# Patient Record
Sex: MALE | Race: WHITE | Employment: UNEMPLOYED | URBAN - METROPOLITAN AREA
[De-identification: names, ages, dates, MRNs, and addresses within clinical notes are randomized per-mention and may not be internally consistent; named-entity substitution may affect disease eponyms.]

---

## 2017-06-24 ENCOUNTER — HOSPITAL ENCOUNTER (EMERGENCY)
Age: 32
Discharge: HOME | End: 2017-06-24
Attending: EMERGENCY MEDICINE
Payer: MEDICAID

## 2017-06-24 ENCOUNTER — APPOINTMENT (OUTPATIENT)
Dept: GENERAL RADIOLOGY | Age: 32
End: 2017-06-24
Payer: MEDICAID

## 2017-06-24 VITALS
OXYGEN SATURATION: 96 % | RESPIRATION RATE: 25 BRPM | HEART RATE: 67 BPM | DIASTOLIC BLOOD PRESSURE: 60 MMHG | SYSTOLIC BLOOD PRESSURE: 119 MMHG | TEMPERATURE: 98.1 F | BODY MASS INDEX: 35.21 KG/M2 | WEIGHT: 260 LBS | HEIGHT: 72 IN

## 2017-06-24 DIAGNOSIS — R11.2 NAUSEA AND VOMITING, INTRACTABILITY OF VOMITING NOT SPECIFIED, UNSPECIFIED VOMITING TYPE: Primary | ICD-10-CM

## 2017-06-24 DIAGNOSIS — R61 DIAPHORESIS: ICD-10-CM

## 2017-06-24 LAB
ABSOLUTE EOS #: 0.5 K/UL (ref 0–0.4)
ABSOLUTE LYMPH #: 2.9 K/UL (ref 1–4.8)
ABSOLUTE MONO #: 0.7 K/UL (ref 0.1–1.2)
ALBUMIN SERPL-MCNC: 3.9 G/DL (ref 3.5–5.2)
ALBUMIN/GLOBULIN RATIO: 1.2 (ref 1–2.5)
ALP BLD-CCNC: 92 U/L (ref 40–129)
ALT SERPL-CCNC: 18 U/L (ref 5–41)
ANION GAP SERPL CALCULATED.3IONS-SCNC: 14 MMOL/L (ref 9–17)
AST SERPL-CCNC: 22 U/L
BASOPHILS # BLD: 1 %
BASOPHILS ABSOLUTE: 0 K/UL (ref 0–0.2)
BILIRUB SERPL-MCNC: 0.55 MG/DL (ref 0.3–1.2)
BUN BLDV-MCNC: 16 MG/DL (ref 6–20)
BUN/CREAT BLD: ABNORMAL (ref 9–20)
CALCIUM SERPL-MCNC: 8.9 MG/DL (ref 8.6–10.4)
CHLORIDE BLD-SCNC: 100 MMOL/L (ref 98–107)
CO2: 23 MMOL/L (ref 20–31)
CREAT SERPL-MCNC: 0.87 MG/DL (ref 0.7–1.2)
DIFFERENTIAL TYPE: ABNORMAL
EOSINOPHILS RELATIVE PERCENT: 6 %
GFR AFRICAN AMERICAN: >60 ML/MIN
GFR NON-AFRICAN AMERICAN: >60 ML/MIN
GFR SERPL CREATININE-BSD FRML MDRD: ABNORMAL ML/MIN/{1.73_M2}
GFR SERPL CREATININE-BSD FRML MDRD: ABNORMAL ML/MIN/{1.73_M2}
GLUCOSE BLD-MCNC: 117 MG/DL (ref 70–99)
HCT VFR BLD CALC: 42.6 % (ref 41–53)
HEMOGLOBIN: 14.2 G/DL (ref 13.5–17.5)
HIV AG/AB: NONREACTIVE
LYMPHOCYTES # BLD: 34 %
MCH RBC QN AUTO: 27.2 PG (ref 26–34)
MCHC RBC AUTO-ENTMCNC: 33.4 G/DL (ref 31–37)
MCV RBC AUTO: 81.5 FL (ref 80–100)
MONOCYTES # BLD: 8 %
PDW BLD-RTO: 14.8 % (ref 12.5–15.4)
PLATELET # BLD: 236 K/UL (ref 140–450)
PLATELET ESTIMATE: ABNORMAL
PMV BLD AUTO: 8.1 FL (ref 6–12)
POC TROPONIN I: 0 NG/ML (ref 0–0.1)
POC TROPONIN I: 0.01 NG/ML (ref 0–0.1)
POC TROPONIN INTERP: NORMAL
POC TROPONIN INTERP: NORMAL
POTASSIUM SERPL-SCNC: 3.9 MMOL/L (ref 3.7–5.3)
RBC # BLD: 5.23 M/UL (ref 4.5–5.9)
RBC # BLD: ABNORMAL 10*6/UL
SEG NEUTROPHILS: 51 %
SEGMENTED NEUTROPHILS ABSOLUTE COUNT: 4.4 K/UL (ref 1.8–7.7)
SODIUM BLD-SCNC: 137 MMOL/L (ref 135–144)
TOTAL PROTEIN: 7.1 G/DL (ref 6.4–8.3)
TSH SERPL DL<=0.05 MIU/L-ACNC: 2.83 MIU/L (ref 0.3–5)
WBC # BLD: 8.6 K/UL (ref 3.5–11)
WBC # BLD: ABNORMAL 10*3/UL

## 2017-06-24 PROCEDURE — 80053 COMPREHEN METABOLIC PANEL: CPT

## 2017-06-24 PROCEDURE — 96374 THER/PROPH/DIAG INJ IV PUSH: CPT

## 2017-06-24 PROCEDURE — 99284 EMERGENCY DEPT VISIT MOD MDM: CPT

## 2017-06-24 PROCEDURE — 2580000003 HC RX 258: Performed by: EMERGENCY MEDICINE

## 2017-06-24 PROCEDURE — 71010 XR CHEST PORTABLE: CPT

## 2017-06-24 PROCEDURE — 93005 ELECTROCARDIOGRAM TRACING: CPT

## 2017-06-24 PROCEDURE — 84484 ASSAY OF TROPONIN QUANT: CPT

## 2017-06-24 PROCEDURE — 6360000002 HC RX W HCPCS: Performed by: EMERGENCY MEDICINE

## 2017-06-24 PROCEDURE — 84443 ASSAY THYROID STIM HORMONE: CPT

## 2017-06-24 PROCEDURE — 96375 TX/PRO/DX INJ NEW DRUG ADDON: CPT

## 2017-06-24 PROCEDURE — 87389 HIV-1 AG W/HIV-1&-2 AB AG IA: CPT

## 2017-06-24 PROCEDURE — 85025 COMPLETE CBC W/AUTO DIFF WBC: CPT

## 2017-06-24 RX ORDER — 0.9 % SODIUM CHLORIDE 0.9 %
1000 INTRAVENOUS SOLUTION INTRAVENOUS ONCE
Status: COMPLETED | OUTPATIENT
Start: 2017-06-24 | End: 2017-06-24

## 2017-06-24 RX ORDER — PROMETHAZINE HYDROCHLORIDE 25 MG/ML
12.5 INJECTION, SOLUTION INTRAMUSCULAR; INTRAVENOUS ONCE
Status: COMPLETED | OUTPATIENT
Start: 2017-06-24 | End: 2017-06-24

## 2017-06-24 RX ORDER — ONDANSETRON 2 MG/ML
4 INJECTION INTRAMUSCULAR; INTRAVENOUS ONCE
Status: COMPLETED | OUTPATIENT
Start: 2017-06-24 | End: 2017-06-24

## 2017-06-24 RX ADMIN — PROMETHAZINE HYDROCHLORIDE 12.5 MG: 25 INJECTION INTRAMUSCULAR; INTRAVENOUS at 08:09

## 2017-06-24 RX ADMIN — SODIUM CHLORIDE 1000 ML: 9 INJECTION, SOLUTION INTRAVENOUS at 06:10

## 2017-06-24 RX ADMIN — ONDANSETRON 4 MG: 2 INJECTION INTRAMUSCULAR; INTRAVENOUS at 06:09

## 2017-06-24 ASSESSMENT — ENCOUNTER SYMPTOMS
DIARRHEA: 0
ABDOMINAL PAIN: 0
NAUSEA: 1
STRIDOR: 0
PHOTOPHOBIA: 0
COUGH: 0
SHORTNESS OF BREATH: 0
VOMITING: 1

## 2017-06-28 LAB
EKG ATRIAL RATE: 70 BPM
EKG P AXIS: 29 DEGREES
EKG P-R INTERVAL: 206 MS
EKG Q-T INTERVAL: 406 MS
EKG QRS DURATION: 106 MS
EKG QTC CALCULATION (BAZETT): 438 MS
EKG R AXIS: 30 DEGREES
EKG T AXIS: 9 DEGREES
EKG VENTRICULAR RATE: 70 BPM

## 2017-09-09 ENCOUNTER — HOSPITAL ENCOUNTER (EMERGENCY)
Age: 32
Discharge: HOME | End: 2017-09-09
Attending: EMERGENCY MEDICINE
Payer: MEDICAID

## 2017-09-09 VITALS
RESPIRATION RATE: 16 BRPM | DIASTOLIC BLOOD PRESSURE: 70 MMHG | OXYGEN SATURATION: 97 % | WEIGHT: 260 LBS | HEART RATE: 79 BPM | HEIGHT: 72 IN | SYSTOLIC BLOOD PRESSURE: 122 MMHG | TEMPERATURE: 97 F | BODY MASS INDEX: 35.21 KG/M2

## 2017-09-09 DIAGNOSIS — K08.89 PAIN, DENTAL: Primary | ICD-10-CM

## 2017-09-09 PROCEDURE — 6370000000 HC RX 637 (ALT 250 FOR IP): Performed by: EMERGENCY MEDICINE

## 2017-09-09 PROCEDURE — 99282 EMERGENCY DEPT VISIT SF MDM: CPT

## 2017-09-09 PROCEDURE — 64400 NJX AA&/STRD TRIGEMINAL NRV: CPT

## 2017-09-09 RX ORDER — PENICILLIN V POTASSIUM 250 MG/1
500 TABLET ORAL ONCE
Status: COMPLETED | OUTPATIENT
Start: 2017-09-09 | End: 2017-09-09

## 2017-09-09 RX ORDER — ACETAMINOPHEN 325 MG/1
650 TABLET ORAL EVERY 6 HOURS PRN
Qty: 30 TABLET | Refills: 0 | Status: SHIPPED | OUTPATIENT
Start: 2017-09-09 | End: 2018-03-13 | Stop reason: ALTCHOICE

## 2017-09-09 RX ORDER — PENICILLIN V POTASSIUM 500 MG/1
500 TABLET ORAL 4 TIMES DAILY
Qty: 40 TABLET | Refills: 0 | Status: SHIPPED | OUTPATIENT
Start: 2017-09-09 | End: 2017-09-19

## 2017-09-09 RX ORDER — IBUPROFEN 800 MG/1
800 TABLET ORAL EVERY 8 HOURS PRN
Qty: 30 TABLET | Refills: 0 | Status: SHIPPED | OUTPATIENT
Start: 2017-09-09 | End: 2018-03-13 | Stop reason: ALTCHOICE

## 2017-09-09 RX ADMIN — PENICILLIN V POTASIUM 500 MG: 250 TABLET ORAL at 08:48

## 2017-09-09 ASSESSMENT — PAIN SCALES - GENERAL: PAINLEVEL_OUTOF10: 10

## 2017-09-09 ASSESSMENT — ENCOUNTER SYMPTOMS
VOMITING: 0
ABDOMINAL PAIN: 0
DIARRHEA: 0

## 2017-09-13 ENCOUNTER — HOSPITAL ENCOUNTER (EMERGENCY)
Age: 32
Discharge: HOME | End: 2017-09-13
Attending: EMERGENCY MEDICINE
Payer: MEDICAID

## 2017-09-13 VITALS
SYSTOLIC BLOOD PRESSURE: 142 MMHG | DIASTOLIC BLOOD PRESSURE: 78 MMHG | RESPIRATION RATE: 16 BRPM | HEART RATE: 80 BPM | OXYGEN SATURATION: 97 % | TEMPERATURE: 97.3 F

## 2017-09-13 DIAGNOSIS — K08.89 PAIN, DENTAL: Primary | ICD-10-CM

## 2017-09-13 PROCEDURE — G0381 LEV 2 HOSP TYPE B ED VISIT: HCPCS

## 2017-09-13 PROCEDURE — 64400 NJX AA&/STRD TRIGEMINAL NRV: CPT

## 2017-09-13 RX ORDER — ONDANSETRON 4 MG/1
4 TABLET, FILM COATED ORAL EVERY 8 HOURS PRN
Qty: 10 TABLET | Refills: 0 | Status: SHIPPED | OUTPATIENT
Start: 2017-09-13 | End: 2018-03-13 | Stop reason: ALTCHOICE

## 2017-09-13 ASSESSMENT — PAIN DESCRIPTION - LOCATION: LOCATION: TEETH

## 2017-09-13 ASSESSMENT — ENCOUNTER SYMPTOMS
FACIAL SWELLING: 0
EYE DISCHARGE: 0
VOMITING: 0
NAUSEA: 1
SHORTNESS OF BREATH: 0
ABDOMINAL PAIN: 0
EYE REDNESS: 0
SORE THROAT: 0

## 2017-09-13 ASSESSMENT — PAIN DESCRIPTION - PAIN TYPE: TYPE: ACUTE PAIN;CHRONIC PAIN

## 2017-09-13 ASSESSMENT — PAIN SCALES - GENERAL: PAINLEVEL_OUTOF10: 8

## 2017-09-13 ASSESSMENT — PAIN DESCRIPTION - FREQUENCY: FREQUENCY: CONTINUOUS

## 2017-09-13 ASSESSMENT — PAIN DESCRIPTION - DESCRIPTORS: DESCRIPTORS: ACHING

## 2018-03-13 ENCOUNTER — OFFICE VISIT (OUTPATIENT)
Dept: FAMILY MEDICINE CLINIC | Age: 33
End: 2018-03-13
Payer: MEDICAID

## 2018-03-13 VITALS
DIASTOLIC BLOOD PRESSURE: 82 MMHG | SYSTOLIC BLOOD PRESSURE: 138 MMHG | TEMPERATURE: 98.3 F | RESPIRATION RATE: 20 BRPM | WEIGHT: 264 LBS | BODY MASS INDEX: 35.76 KG/M2 | HEART RATE: 82 BPM | HEIGHT: 72 IN | OXYGEN SATURATION: 96 %

## 2018-03-13 DIAGNOSIS — T40.2X5A CONSTIPATION DUE TO OPIOID THERAPY: Primary | ICD-10-CM

## 2018-03-13 DIAGNOSIS — R53.82 CHRONIC FATIGUE: ICD-10-CM

## 2018-03-13 DIAGNOSIS — Z23 NEED FOR TDAP VACCINATION: ICD-10-CM

## 2018-03-13 DIAGNOSIS — Z76.89 ENCOUNTER TO ESTABLISH CARE: ICD-10-CM

## 2018-03-13 DIAGNOSIS — K59.03 CONSTIPATION DUE TO OPIOID THERAPY: Primary | ICD-10-CM

## 2018-03-13 PROBLEM — K59.00 CONSTIPATION: Status: ACTIVE | Noted: 2018-03-13

## 2018-03-13 PROCEDURE — G8427 DOCREV CUR MEDS BY ELIG CLIN: HCPCS | Performed by: NURSE PRACTITIONER

## 2018-03-13 PROCEDURE — G8417 CALC BMI ABV UP PARAM F/U: HCPCS | Performed by: NURSE PRACTITIONER

## 2018-03-13 PROCEDURE — G8484 FLU IMMUNIZE NO ADMIN: HCPCS | Performed by: NURSE PRACTITIONER

## 2018-03-13 PROCEDURE — 90715 TDAP VACCINE 7 YRS/> IM: CPT | Performed by: NURSE PRACTITIONER

## 2018-03-13 PROCEDURE — 99203 OFFICE O/P NEW LOW 30 MIN: CPT | Performed by: NURSE PRACTITIONER

## 2018-03-13 PROCEDURE — 4004F PT TOBACCO SCREEN RCVD TLK: CPT | Performed by: NURSE PRACTITIONER

## 2018-03-13 PROCEDURE — 90471 IMMUNIZATION ADMIN: CPT | Performed by: NURSE PRACTITIONER

## 2018-03-13 RX ORDER — METHADONE HYDROCHLORIDE 40 MG/1
TABLET ORAL
COMMUNITY
End: 2018-09-16

## 2018-03-13 RX ORDER — DOCUSATE SODIUM 100 MG/1
100 CAPSULE, LIQUID FILLED ORAL DAILY
Qty: 30 CAPSULE | Refills: 5 | Status: SHIPPED | OUTPATIENT
Start: 2018-03-13 | End: 2018-09-16

## 2018-03-13 RX ORDER — GREEN TEA/HOODIA GORDONII 315-12.5MG
1 CAPSULE ORAL DAILY
Qty: 30 TABLET | Refills: 5 | Status: SHIPPED | OUTPATIENT
Start: 2018-03-13 | End: 2018-09-16

## 2018-03-13 ASSESSMENT — PATIENT HEALTH QUESTIONNAIRE - PHQ9
1. LITTLE INTEREST OR PLEASURE IN DOING THINGS: 0
SUM OF ALL RESPONSES TO PHQ9 QUESTIONS 1 & 2: 0
2. FEELING DOWN, DEPRESSED OR HOPELESS: 0
SUM OF ALL RESPONSES TO PHQ QUESTIONS 1-9: 0

## 2018-03-13 ASSESSMENT — ENCOUNTER SYMPTOMS
COUGH: 0
DIARRHEA: 0
CONSTIPATION: 1
CHEST TIGHTNESS: 0
SHORTNESS OF BREATH: 0
ABDOMINAL PAIN: 1
NAUSEA: 0

## 2018-03-13 NOTE — PROGRESS NOTES
Visit Information    Have you changed or started any medications since your last visit including any over-the-counter medicines, vitamins, or herbal medicines? no   Have you stopped taking any of your medications? Is so, why? -  no  Are you having any side effects from any of your medications? - no    Have you seen any other physician or provider since your last visit?  no   Have you had any other diagnostic tests since your last visit?  no   Have you been seen in the emergency room and/or had an admission in a hospital since we last saw you?  no   Have you had your routine dental cleaning in the past 6 months?  no     Do you have an active MyChart account? If no, what is the barrier?   Yes    No care team member to display    Medical History Review  Past Medical, Family, and Social History reviewed and does not contribute to the patient presenting condition    Health Maintenance   Topic Date Due    DTaP/Tdap/Td vaccine (1 - Tdap) 06/07/2002    Pneumococcal med risk (1 of 1 - PPSV23) 06/07/2002    Flu vaccine (1) 09/01/2017    HIV screen  Completed

## 2018-03-13 NOTE — PROGRESS NOTES
[address] [place]  [phone]    Amy Amaro is a 27 y.o. male who presents today for his  medical conditions/complaints as noted below. Amy Amaro is c/o of Establish Care (has not seen a physician in years); Fatigue (x1 year); Dizziness; and Abdominal Pain    HPI:     HPI   Pt here to establish. Pt c/o fatigue, dizziness and abd pain for a year. Goes to bed early, 8pm, up at 5am every day. This is his schedule because he has to be downtown for methadone dose by 5:30. He goes daily because he does not go to [facility] so they wont send him home with take homes. He does not regularly nap.  abd pain causes the dizziness. I use the bathroom once a day most of the time. He does have occassions where he goes a few days without a stool. I normally have to have a bm when I get home from [facility]. I don't know if I am constipated. Sometimes the stool is hard. No blood or black tarry stools. No hematuria. On methadone, 85 mg everyday. wishes he could taper down. He has asked his counselor but he states they never make any change. Pt verbalizes that he understands that he needs to be more in control over this. Diet- I dont eat breakfast, lunch is subway or [employer], his wife cooks dinner every night. No fruits or vegetables. Corn and potatoes. He drinks a lot of coffee. He puts a lot of sugar in his coffee. He has gained 60 lbs in three years. Exercise- no   Two kids 10 and 6 both boys. Recently had some dental work, needs more    Nursing note reviewed and discussed with patient. Patient's medications, allergies, past medical, surgical, social and family histories were reviewed and updated as appropriate. No current outpatient prescriptions on file prior to visit. No current facility-administered medications on file prior to visit.       Past Medical History:   Diagnosis Date    Substance abuse detoxing, using Methadone       Past Surgical History:   Procedure Laterality Date    FACIAL COSMETIC SURGERY      from dog bite injury     Family History   Problem Relation Age of Onset    Cancer Other      lung, breast, stomach in grandparents   [facility] Other Mother      suicide    Cancer Father      lung     Social History   Substance Use Topics    Smoking status: Current Every Day Smoker     Packs/day: 1.00     Years: 10.00     Types: Cigarettes    Smokeless tobacco: Never Used    Alcohol use No      Allergies   Allergen Reactions    Codeine Nausea And Vomiting       Subjective:      Review of Systems   Constitutional: Positive for fatigue. Negative for activity change, appetite change, chills and fever. HENT: Negative for congestion, ear pain and hearing loss. Eyes: Negative for visual disturbance. Respiratory: Negative for cough, chest tightness and shortness of breath. Cardiovascular: Negative for chest pain, palpitations and leg swelling. Gastrointestinal: Positive for abdominal pain and constipation. Negative for diarrhea and nausea. Genitourinary: Negative for discharge and frequency. Musculoskeletal: Negative for arthralgias and myalgias. Skin: Negative for rash. Neurological: Positive for dizziness. Negative for light-headedness and headaches. Psychiatric/Behavioral: Negative for dysphoric mood and self-injury. Other pertinent ROS in HPI  Objective:     /82 (Site: Right Arm, Position: Sitting, Cuff Size: Medium Adult)   Pulse 82   Temp 98.3 °F (36.8 °C) (Oral)   Resp 20   Ht 6' 0.01\" (1.829 m)   Wt 264 lb (119.7 kg)   SpO2 96%   BMI 35.80 kg/m²    Physical Exam   Constitutional: He is oriented to person, place, and time. He appears well-developed and well-nourished. Obese   HENT:   Head: Normocephalic and atraumatic.    Right Ear: External ear normal.   Left Ear: External ear normal.   Nose: Nose normal.   Mouth/Throat: Oropharynx is clear and moist. Abnormal

## 2018-05-28 ENCOUNTER — HOSPITAL ENCOUNTER (EMERGENCY)
Age: 33
Discharge: HOME | End: 2018-05-28
Attending: EMERGENCY MEDICINE
Payer: MEDICAID

## 2018-05-28 VITALS
WEIGHT: 268 LBS | HEART RATE: 88 BPM | HEIGHT: 72 IN | TEMPERATURE: 97.8 F | RESPIRATION RATE: 14 BRPM | OXYGEN SATURATION: 99 % | DIASTOLIC BLOOD PRESSURE: 68 MMHG | SYSTOLIC BLOOD PRESSURE: 123 MMHG | BODY MASS INDEX: 36.3 KG/M2

## 2018-05-28 DIAGNOSIS — M54.32 SCIATICA OF LEFT SIDE: Primary | ICD-10-CM

## 2018-05-28 PROCEDURE — 6370000000 HC RX 637 (ALT 250 FOR IP): Performed by: PHYSICIAN ASSISTANT

## 2018-05-28 PROCEDURE — 6360000002 HC RX W HCPCS: Performed by: PHYSICIAN ASSISTANT

## 2018-05-28 PROCEDURE — 99283 EMERGENCY DEPT VISIT LOW MDM: CPT

## 2018-05-28 PROCEDURE — 96372 THER/PROPH/DIAG INJ SC/IM: CPT

## 2018-05-28 RX ORDER — ORPHENADRINE CITRATE 30 MG/ML
60 INJECTION INTRAMUSCULAR; INTRAVENOUS ONCE
Status: COMPLETED | OUTPATIENT
Start: 2018-05-28 | End: 2018-05-28

## 2018-05-28 RX ORDER — IBUPROFEN 800 MG/1
800 TABLET ORAL EVERY 8 HOURS PRN
Qty: 20 TABLET | Refills: 0 | Status: SHIPPED | OUTPATIENT
Start: 2018-05-28 | End: 2018-09-16

## 2018-05-28 RX ORDER — KETOROLAC TROMETHAMINE 30 MG/ML
60 INJECTION, SOLUTION INTRAMUSCULAR; INTRAVENOUS ONCE
Status: COMPLETED | OUTPATIENT
Start: 2018-05-28 | End: 2018-05-28

## 2018-05-28 RX ORDER — PREDNISONE 50 MG/1
50 TABLET ORAL DAILY
Qty: 5 TABLET | Refills: 0 | Status: SHIPPED | OUTPATIENT
Start: 2018-05-28 | End: 2018-06-02

## 2018-05-28 RX ADMIN — ORPHENADRINE CITRATE 60 MG: 30 INJECTION INTRAMUSCULAR; INTRAVENOUS at 20:38

## 2018-05-28 RX ADMIN — PREDNISONE 50 MG: 10 TABLET ORAL at 20:39

## 2018-05-28 RX ADMIN — KETOROLAC TROMETHAMINE 60 MG: 30 INJECTION, SOLUTION INTRAMUSCULAR at 20:39

## 2018-05-28 ASSESSMENT — PAIN SCALES - GENERAL
PAINLEVEL_OUTOF10: 8
PAINLEVEL_OUTOF10: 8

## 2018-05-28 ASSESSMENT — PAIN DESCRIPTION - ORIENTATION: ORIENTATION: LEFT

## 2018-05-28 ASSESSMENT — PAIN DESCRIPTION - LOCATION: LOCATION: BACK;LEG

## 2018-06-13 PROBLEM — K02.9 DENTAL CARIES: Status: ACTIVE | Noted: 2017-09-25

## 2018-09-16 ENCOUNTER — HOSPITAL ENCOUNTER (EMERGENCY)
Age: 33
Discharge: HOME | End: 2018-09-16
Attending: EMERGENCY MEDICINE
Payer: MEDICAID

## 2018-09-16 VITALS
RESPIRATION RATE: 18 BRPM | OXYGEN SATURATION: 97 % | WEIGHT: 263 LBS | HEART RATE: 83 BPM | HEIGHT: 72 IN | DIASTOLIC BLOOD PRESSURE: 86 MMHG | TEMPERATURE: 98.4 F | BODY MASS INDEX: 35.62 KG/M2 | SYSTOLIC BLOOD PRESSURE: 133 MMHG

## 2018-09-16 DIAGNOSIS — J40 BRONCHITIS: Primary | ICD-10-CM

## 2018-09-16 LAB
EKG ATRIAL RATE: 75 BPM
EKG P AXIS: 41 DEGREES
EKG P-R INTERVAL: 172 MS
EKG Q-T INTERVAL: 376 MS
EKG QRS DURATION: 98 MS
EKG QTC CALCULATION (BAZETT): 419 MS
EKG R AXIS: 35 DEGREES
EKG T AXIS: 14 DEGREES
EKG VENTRICULAR RATE: 75 BPM

## 2018-09-16 PROCEDURE — 93005 ELECTROCARDIOGRAM TRACING: CPT

## 2018-09-16 PROCEDURE — 6370000000 HC RX 637 (ALT 250 FOR IP): Performed by: EMERGENCY MEDICINE

## 2018-09-16 PROCEDURE — 99284 EMERGENCY DEPT VISIT MOD MDM: CPT

## 2018-09-16 RX ORDER — METHADONE HYDROCHLORIDE 40 MG/1
12 TABLET ORAL DAILY
COMMUNITY
End: 2022-03-29

## 2018-09-16 RX ORDER — BENZONATATE 100 MG/1
100 CAPSULE ORAL ONCE
Status: COMPLETED | OUTPATIENT
Start: 2018-09-16 | End: 2018-09-16

## 2018-09-16 RX ORDER — PSEUDOEPHEDRINE HYDROCHLORIDE 30 MG/1
30 TABLET ORAL EVERY 6 HOURS PRN
Qty: 20 TABLET | Refills: 1 | Status: SHIPPED | OUTPATIENT
Start: 2018-09-16 | End: 2018-09-21

## 2018-09-16 RX ORDER — BENZONATATE 100 MG/1
100 CAPSULE ORAL 3 TIMES DAILY PRN
Qty: 30 CAPSULE | Refills: 0 | Status: SHIPPED | OUTPATIENT
Start: 2018-09-16 | End: 2018-09-23

## 2018-09-16 RX ADMIN — BENZONATATE 100 MG: 100 CAPSULE ORAL at 06:43

## 2018-09-16 ASSESSMENT — ENCOUNTER SYMPTOMS
CHEST TIGHTNESS: 1
COUGH: 1
ALLERGIC/IMMUNOLOGIC NEGATIVE: 1
EYES NEGATIVE: 1
GASTROINTESTINAL NEGATIVE: 1

## 2018-09-16 ASSESSMENT — PAIN SCALES - GENERAL: PAINLEVEL_OUTOF10: 2

## 2018-09-16 ASSESSMENT — PAIN DESCRIPTION - DESCRIPTORS: DESCRIPTORS: ACHING

## 2018-09-16 NOTE — ED PROVIDER NOTES
display       LABS:  Labs Reviewed - No data to display    EMERGENCY DEPARTMENT COURSE:     -------------------------  BP: 133/86, Temp: 98.4 °F (36.9 °C), Pulse: 83, Resp: 18  Physical Exam   Constitutional: He is oriented to person, place, and time. He appears well-developed and well-nourished. HENT:   Head: Normocephalic and atraumatic. Right Ear: External ear normal.   Left Ear: External ear normal.   Eyes: Right eye exhibits no discharge. Left eye exhibits no discharge. No scleral icterus. Neck: Normal range of motion. No tracheal deviation present. Pulmonary/Chest: Effort normal. No stridor. No respiratory distress. Musculoskeletal: Normal range of motion. Neurological: He is alert and oriented to person, place, and time. Coordination normal.   Skin: Skin is warm and dry. He is not diaphoretic. Psychiatric: He has a normal mood and affect. His behavior is normal.         Comments  The pt has no signs of pneumonia, no wheezing or signs of asthma, the pt smokes and has no signs of desaturation. Patient was instructed on smoking and tobacco use cessation, intermediate, greater than 3 minutes. Reasons to return to the ED, potential complications, expected course of the patient's condition, and recommended treatments were discussed with the patient. The patient was also instructed to follow up with their primary care physician and/or any appropriate specialists. The patient expressed understanding and agreement, and will be discharged in good condition. EKG Interpretation   Interpreted by Isabella Matos DO    Rhythm: normal sinus   Rate: normal  Axis: normal  Ectopy: none  Conduction: normal  ST Segments: normal  T Waves: normal  Q Waves: none    Clinical Impression: no acute changes normal EKG        Lange DO, RDMS.   Attending Emergency Physician          Isabella Matos DO  09/16/18 7389

## 2018-09-16 NOTE — ED PROVIDER NOTES
[facility]  ED  Emergency Department Encounter  Emergency Medicine Resident     Pt Name: Annette Kaur  MRN: [id number]  [place] 1985  Date of evaluation: 9/16/18  PCP:  MASOUD Keller [id number]       Chief Complaint   Patient presents with    Cough    Other     chest congestion, chest tightness    Nasal Congestion     since thursday       HISTORY OF PRESENT ILLNESS  (Location/Symptom, Timing/Onset, Context/Setting, Quality, Duration, Modifying Factors, Severity.)      Annette Kaur is a 26 y.o. male who presents with  complains of a cough, chest tightness isn't going on since Thursday. Patient states that initially cough is productive of greenish sputum but over the past couple days it is cleared for whitish sputum. Patient also complains of a sore throat that has since resolved. Patient states that initially he had a runny nose but that has also resolved. He reports 1 day of chills. Patient states that he has had pneumonia in the past and was worried about it today. Denies any history of high blood pressure, denies any shortness of diabetes, previous history of heart disease. Patient is currently on methadone, denies any IV drug use. PAST MEDICAL / SURGICAL / SOCIAL / FAMILY HISTORY      has a past medical history of Substance abuse.     has a past surgical history that includes Facial cosmetic surgery. Social History     Social History    Marital status: Single     Spouse name: N/A    Number of children: N/A    Years of education: N/A     Occupational History    Not on file. Social History Main Topics    Smoking status: Current Every Day Smoker     Packs/day: 1.00     Years: 10.00     Types: Cigarettes    Smokeless tobacco: Never Used    Alcohol use No    Drug use: No      Comment: recovering heroin abuse.     Sexual activity: Yes     Partners: Female     Other Topics Concern    Not on file     Social History Narrative    No He exhibits no tenderness. Abdominal: Soft. Bowel sounds are normal. He exhibits no distension. There is no tenderness. There is no guarding. Musculoskeletal: Normal range of motion. He exhibits no edema or deformity. Lymphadenopathy:     He has no cervical adenopathy. Neurological: He is alert and oriented to person, place, and time. No cranial nerve deficit. He exhibits normal muscle tone. Skin: Skin is warm and dry. No rash noted. He is not diaphoretic. Nursing note and vitals reviewed. DIFFERENTIAL  DIAGNOSIS     PLAN (LABS / IMAGING / EKG):  Orders Placed This Encounter   Procedures    EKG 12 Lead       MEDICATIONS ORDERED:  Orders Placed This Encounter   Medications    benzonatate (TESSALON) capsule 100 mg    benzonatate (TESSALON PERLES) 100 MG capsule     Sig: Take 1 capsule by mouth 3 times daily as needed for Cough     Dispense:  30 capsule     Refill:  0       DDX: URI, bronchitis, pneumonia, ACS    DIAGNOSTIC RESULTS / EMERGENCY DEPARTMENT COURSE / MDM     LABS:  No results found for this visit on 09/16/18. IMPRESSION: URI, postviral bronchitis. Vital signs are unremarkable. Physical exam, patient has normal heart size, normal lung sounds, patient looks very well. We'll get a screening EKG, treat with Tessalon Perles and continue to monitor. EKG  EKG Interpretation    Rhythm: normal sinus   Rate: normal  Axis: normal  Conduction: normal  ST Segments: normal  T Waves: normal  Q Waves: normal    Clinical Impression: Normal EKG. Juanita Vega MD      All EKG's are interpreted by the Emergency Department Physician who either signs or Co-signs this chart in the absence of a cardiologist.      PROCEDURES:  None    CONSULTS:  None    CRITICAL CARE:  None    FINAL IMPRESSION      1.  Bronchitis          DISPOSITION / PLAN     DISPOSITION Decision To Discharge 09/16/2018 07:47:34 AM      PATIENT REFERRED TO:  MASOUD Belcher - CNP  [address] [place] [address]  [phone]    Schedule an appointment as soon as possible for a visit   For Follow up appointment    [facility] ED  [address] [place] [address]  [phone]  Go to   As needed, If symptoms worsen      DISCHARGE MEDICATIONS:  New Prescriptions    BENZONATATE (TESSALON PERLES) 100 MG CAPSULE    Take 1 capsule by mouth 3 times daily as needed for Cough       Simin Norton MD  Emergency Medicine Resident    (Please note that portions of this note were completed with a voice recognition program.  Efforts were made to edit the dictations but occasionally words are mis-transcribed.)       Simin Norton MD  Resident  09/16/18 0044

## 2018-09-16 NOTE — ED NOTES
Report recd from [facility], pt resting without distress. No concerns voiced.       Hiwot Rubalcava RN  09/16/18 [id number]

## 2019-01-08 ENCOUNTER — TELEPHONE (OUTPATIENT)
Dept: GASTROENTEROLOGY | Age: 34
End: 2019-01-08

## 2019-01-10 ENCOUNTER — OFFICE VISIT (OUTPATIENT)
Dept: GASTROENTEROLOGY | Age: 34
End: 2019-01-10
Payer: MEDICAID

## 2019-01-10 VITALS
DIASTOLIC BLOOD PRESSURE: 85 MMHG | HEIGHT: 72 IN | BODY MASS INDEX: 35.58 KG/M2 | WEIGHT: 262.7 LBS | HEART RATE: 87 BPM | SYSTOLIC BLOOD PRESSURE: 131 MMHG

## 2019-01-10 DIAGNOSIS — K59.00 CONSTIPATION, UNSPECIFIED CONSTIPATION TYPE: Primary | ICD-10-CM

## 2019-01-10 DIAGNOSIS — R11.0 NAUSEA: ICD-10-CM

## 2019-01-10 DIAGNOSIS — R14.0 ABDOMINAL BLOATING: ICD-10-CM

## 2019-01-10 DIAGNOSIS — K21.9 GASTROESOPHAGEAL REFLUX DISEASE WITHOUT ESOPHAGITIS: ICD-10-CM

## 2019-01-10 DIAGNOSIS — K62.89 RECTAL PAIN: ICD-10-CM

## 2019-01-10 PROCEDURE — 99244 OFF/OP CNSLTJ NEW/EST MOD 40: CPT | Performed by: INTERNAL MEDICINE

## 2019-01-10 PROCEDURE — G8484 FLU IMMUNIZE NO ADMIN: HCPCS | Performed by: INTERNAL MEDICINE

## 2019-01-10 PROCEDURE — G8427 DOCREV CUR MEDS BY ELIG CLIN: HCPCS | Performed by: INTERNAL MEDICINE

## 2019-01-10 PROCEDURE — G8417 CALC BMI ABV UP PARAM F/U: HCPCS | Performed by: INTERNAL MEDICINE

## 2019-01-10 ASSESSMENT — ENCOUNTER SYMPTOMS
VOMITING: 0
ABDOMINAL DISTENTION: 0
CHEST TIGHTNESS: 0
COLOR CHANGE: 0
SINUS PRESSURE: 0
EYE REDNESS: 0
ABDOMINAL PAIN: 1
NAUSEA: 1
WHEEZING: 0
EYE PAIN: 0
TROUBLE SWALLOWING: 0
DIARRHEA: 0
SINUS PAIN: 0
BLOOD IN STOOL: 0
CONSTIPATION: 1
ANAL BLEEDING: 1
SHORTNESS OF BREATH: 0
BACK PAIN: 0
RECTAL PAIN: 1

## 2019-01-11 RX ORDER — POLYETHYLENE GLYCOL 3350 17 G/17G
POWDER, FOR SOLUTION ORAL
Qty: 255 G | Refills: 0 | Status: SHIPPED | OUTPATIENT
Start: 2019-01-11 | End: 2020-06-21

## 2019-01-14 ENCOUNTER — TELEPHONE (OUTPATIENT)
Dept: GASTROENTEROLOGY | Age: 34
End: 2019-01-14

## 2019-06-18 ENCOUNTER — HOSPITAL ENCOUNTER (EMERGENCY)
Age: 34
Discharge: HOME | End: 2019-06-18
Attending: EMERGENCY MEDICINE
Payer: MEDICAID

## 2019-06-18 ENCOUNTER — APPOINTMENT (OUTPATIENT)
Dept: GENERAL RADIOLOGY | Age: 34
End: 2019-06-18
Payer: MEDICAID

## 2019-06-18 VITALS
DIASTOLIC BLOOD PRESSURE: 84 MMHG | RESPIRATION RATE: 14 BRPM | TEMPERATURE: 99.1 F | HEART RATE: 92 BPM | OXYGEN SATURATION: 98 % | SYSTOLIC BLOOD PRESSURE: 138 MMHG

## 2019-06-18 DIAGNOSIS — S90.221A SUBUNGUAL HEMATOMA OF TOE OF RIGHT FOOT, INITIAL ENCOUNTER: Primary | ICD-10-CM

## 2019-06-18 PROCEDURE — 73630 X-RAY EXAM OF FOOT: CPT

## 2019-06-18 PROCEDURE — 6370000000 HC RX 637 (ALT 250 FOR IP): Performed by: EMERGENCY MEDICINE

## 2019-06-18 PROCEDURE — 11740 EVACUATION SUBUNGUAL HMTMA: CPT

## 2019-06-18 PROCEDURE — 99283 EMERGENCY DEPT VISIT LOW MDM: CPT

## 2019-06-18 RX ORDER — ACETAMINOPHEN 325 MG/1
325 TABLET ORAL EVERY 6 HOURS PRN
Qty: 120 TABLET | Refills: 0 | Status: SHIPPED | OUTPATIENT
Start: 2019-06-18 | End: 2020-06-21

## 2019-06-18 RX ORDER — ACETAMINOPHEN 500 MG
1000 TABLET ORAL ONCE
Status: COMPLETED | OUTPATIENT
Start: 2019-06-18 | End: 2019-06-18

## 2019-06-18 RX ADMIN — ACETAMINOPHEN 1000 MG: 500 TABLET ORAL at 10:05

## 2019-06-18 ASSESSMENT — PAIN DESCRIPTION - ORIENTATION: ORIENTATION: RIGHT

## 2019-06-18 ASSESSMENT — ENCOUNTER SYMPTOMS
ALLERGIC/IMMUNOLOGIC NEGATIVE: 1
GASTROINTESTINAL NEGATIVE: 1
RESPIRATORY NEGATIVE: 1
COLOR CHANGE: 1
EYES NEGATIVE: 1

## 2019-06-18 ASSESSMENT — PAIN DESCRIPTION - LOCATION: LOCATION: TOE (COMMENT WHICH ONE)

## 2019-06-18 ASSESSMENT — PAIN DESCRIPTION - DESCRIPTORS: DESCRIPTORS: CONSTANT

## 2019-06-18 ASSESSMENT — PAIN DESCRIPTION - FREQUENCY: FREQUENCY: CONTINUOUS

## 2019-06-18 ASSESSMENT — PAIN DESCRIPTION - PAIN TYPE: TYPE: ACUTE PAIN

## 2019-06-18 ASSESSMENT — PAIN SCALES - GENERAL
PAINLEVEL_OUTOF10: 8
PAINLEVEL_OUTOF10: 8

## 2019-06-18 NOTE — ED PROVIDER NOTES
LABS:  Labs Reviewed - No data to display      EMERGENCY DEPARTMENT COURSE:     -------------------------       BP: 138/84, Temp: 99.1 °F (37.3 °C), Pulse: 92, Resp: 14      Comments            Moore MD, F.A.C.E.P.   Attending Emergency Physician         Marina Morel MD  06/18/19 [id number]

## 2019-06-18 NOTE — ED TRIAGE NOTES
Pt presents to ED with steady gait c/o pain to right great toe after dropping can of corn on his foot this morning. Pt states he took 1600mg ibuprofen at 0630 this morning. Pt denies any other complaints at this time. NAD noted respirations even and non labored. Call light within reach, will continue to monitor.

## 2019-06-18 NOTE — ED PROVIDER NOTES
[facility] ED  Emergency Department Encounter  EmergencyMedicine Resident     Pt Name:Dwight Scherer  MRN: [id number]  [place] 1985  Date of evaluation: 6/18/19  PCP:  No primary care provider on file. CHIEF COMPLAINT       Chief Complaint   Patient presents with    Toe Pain     c/o right great to pain after can of corn fell on his toe, pt states he took 1600mg ibuprofen around 0630       HISTORY OF PRESENT ILLNESS  (Location/Symptom, Timing/Onset, Context/Setting, Quality, Duration, Modifying Factors, Severity.)      Blanka Calzada is a 37 y.o. male who presents with pain to the right toe. Patient reports that he mistakenly dropped a big can of corn on his right big toe. PAST MEDICAL / SURGICAL / SOCIAL / FAMILY HISTORY      has a past medical history of Substance abuse ([facility] [address] has a past surgical history that includes Facial cosmetic surgery. Social History     Socioeconomic History    Marital status: Single     Spouse name: Not on file    Number of children: Not on file    Years of education: Not on file    Highest education level: Not on file   Occupational History    Not on file   Social Needs    Financial resource strain: Not on file    Food insecurity:     Worry: Not on file     Inability: Not on file    Transportation needs:     Medical: Not on file     Non-medical: Not on file   Tobacco Use    Smoking status: Current Every Day Smoker     Packs/day: 1.00     Years: 10.00     Pack years: 10.00     Types: Cigarettes    Smokeless tobacco: Never Used   Substance and Sexual Activity    Alcohol use: No    Drug use: No     Comment: recovering heroin abuse.     Sexual activity: Yes     Partners: Female   Lifestyle    Physical activity:     Days per week: Not on file     Minutes per session: Not on file    Stress: Not on file   Relationships    Social connections:     Talks on phone: Not on file     Gets together: Not on file     Attends Voodoo service: Not on file     Active member of club or organization: Not on file     Attends meetings of clubs or organizations: Not on file     Relationship status: Not on file    Intimate partner violence:     Fear of current or ex partner: Not on file     Emotionally abused: Not on file     Physically abused: Not on file     Forced sexual activity: Not on file   Other Topics Concern    Not on file   Social History Narrative    Not on file       Family History   Problem Relation Age of Onset    Cancer Other         lung, breast, stomach in grandparents   Lisa Pierce Other Mother         suicide   Lisa Pierce Cancer Father         lung       Allergies:  Codeine    Home Medications:  Prior to Admission medications    Medication Sig Start Date End Date Taking? Authorizing Provider   acetaminophen (TYLENOL) 325 MG tablet Take 1 tablet by mouth every 6 hours as needed for Pain 6/18/19  Yes Timothy Bro MD   polyethylene glycol (GLYCOLAX) powder Use as directed by following your patient instructions given by office. 1/11/19   Cesilia Wolfe MD   methadone (METHADOSE) 40 MG disintegrating tablet Take 56 mg by mouth daily. Margoth Ward Historical Provider, MD       REVIEW OF SYSTEMS    (2-9 systems for level 4, 10 or more for level 5)      Review of Systems   Constitutional: Negative. HENT: Negative. Eyes: Negative. Respiratory: Negative. Cardiovascular: Negative. Gastrointestinal: Negative. Endocrine: Negative. Genitourinary: Negative. Musculoskeletal: Positive for arthralgias. Skin: Positive for color change. Allergic/Immunologic: Negative. Neurological: Negative. Hematological: Negative. Psychiatric/Behavioral: Negative. PHYSICAL EXAM   (up to 7 for level 4, 8 or more for level 5)      INITIAL VITALS:   /84   Pulse 92   Temp 99.1 °F (37.3 °C) (Oral)   Resp 14   SpO2 98%     Physical Exam   Constitutional: He is oriented to person, place, and time.    HENT:   Head: Normocephalic and COMPARISON: None. HISTORY: ORDERING SYSTEM PROVIDED HISTORY: toe pain TECHNOLOGIST PROVIDED HISTORY: toe pain Ordering Physician Provided Reason for Exam: right big toe pain Acuity: Acute Type of Exam: Initial 41-year-old male with right big toe pain FINDINGS: Osseous alignment is normal.  Joint spaces are well maintained. No marginal erosions are identified. No acute fracture or gross dislocation is seen. The medial and middle cuneiforms demonstrate proper alignment with the base of the 1st and 2nd metatarsals respectively. No tibiotalar joint effusion is seen. Boehler's angle is maintained. Mild plantar calcaneal spur. Mild enthesopathy at the distal Achilles tendon insertion onto the posterior calcaneus. No acute fracture or gross dislocation. Trephination of the right big toe subungual hematoma performed at bedside with small amount of blood expressed from the holes. Clean dressing applied to the right big toe, patient provided with crutches and fracture boot, Tylenol, instructed to follow-up with PCP. PROCEDURES:  None    CONSULTS:  None    CRITICAL CARE:  None    FINAL IMPRESSION      1.  Subungual hematoma of toe of right foot, initial encounter          DISPOSITION / PLAN     DISPOSITION Decision To Discharge 06/18/2019 10:55:28 AM      PATIENT REFERRED TO:  [facility] ED  [address] [place] [address]  [phone]  Go to   As needed, If symptoms worsen      DISCHARGE MEDICATIONS:  New Prescriptions    ACETAMINOPHEN (TYLENOL) 325 MG TABLET    Take 1 tablet by mouth every 6 hours as needed for Pain       Kathleen Peng MD  Emergency Medicine Resident    (Please note that portions of thisnote were completed with a voice recognition program.  Efforts were made to edit the dictations but occasionally words are mis-transcribed.)       Kathleen Peng MD  Resident  06/18/19 1100

## 2020-06-21 ENCOUNTER — HOSPITAL ENCOUNTER (EMERGENCY)
Age: 35
Discharge: HOME | End: 2020-06-21
Attending: EMERGENCY MEDICINE
Payer: MEDICAID

## 2020-06-21 VITALS
BODY MASS INDEX: 35.89 KG/M2 | WEIGHT: 265 LBS | SYSTOLIC BLOOD PRESSURE: 140 MMHG | DIASTOLIC BLOOD PRESSURE: 83 MMHG | OXYGEN SATURATION: 97 % | TEMPERATURE: 97.9 F | HEART RATE: 76 BPM | RESPIRATION RATE: 18 BRPM | HEIGHT: 72 IN

## 2020-06-21 PROCEDURE — 99282 EMERGENCY DEPT VISIT SF MDM: CPT

## 2020-06-21 PROCEDURE — 6370000000 HC RX 637 (ALT 250 FOR IP): Performed by: STUDENT IN AN ORGANIZED HEALTH CARE EDUCATION/TRAINING PROGRAM

## 2020-06-21 RX ORDER — CYCLOBENZAPRINE HCL 5 MG
5 TABLET ORAL 2 TIMES DAILY PRN
Qty: 10 TABLET | Refills: 0 | Status: SHIPPED | OUTPATIENT
Start: 2020-06-21 | End: 2020-07-01

## 2020-06-21 RX ORDER — CYCLOBENZAPRINE HCL 10 MG
5 TABLET ORAL ONCE
Status: COMPLETED | OUTPATIENT
Start: 2020-06-21 | End: 2020-06-21

## 2020-06-21 RX ADMIN — CYCLOBENZAPRINE 5 MG: 10 TABLET, FILM COATED ORAL at 08:55

## 2020-06-21 ASSESSMENT — ENCOUNTER SYMPTOMS
COUGH: 0
SHORTNESS OF BREATH: 0
VOMITING: 0
ABDOMINAL PAIN: 0
NAUSEA: 0

## 2020-06-21 ASSESSMENT — PAIN DESCRIPTION - DESCRIPTORS: DESCRIPTORS: SHARP;SHOOTING

## 2020-06-21 ASSESSMENT — PAIN SCALES - GENERAL: PAINLEVEL_OUTOF10: 8

## 2020-06-21 ASSESSMENT — PAIN DESCRIPTION - PAIN TYPE: TYPE: ACUTE PAIN

## 2020-06-21 ASSESSMENT — PAIN DESCRIPTION - LOCATION: LOCATION: NECK

## 2020-06-21 ASSESSMENT — PAIN DESCRIPTION - ORIENTATION: ORIENTATION: RIGHT

## 2020-06-26 ENCOUNTER — OFFICE VISIT (OUTPATIENT)
Dept: INTERNAL MEDICINE CLINIC | Age: 35
End: 2020-06-26
Payer: MEDICAID

## 2020-06-26 VITALS
HEIGHT: 72 IN | HEART RATE: 88 BPM | RESPIRATION RATE: 16 BRPM | DIASTOLIC BLOOD PRESSURE: 78 MMHG | BODY MASS INDEX: 36.03 KG/M2 | TEMPERATURE: 98.2 F | SYSTOLIC BLOOD PRESSURE: 118 MMHG | WEIGHT: 266 LBS

## 2020-06-26 PROCEDURE — 99203 OFFICE O/P NEW LOW 30 MIN: CPT | Performed by: INTERNAL MEDICINE

## 2020-06-26 PROCEDURE — G8417 CALC BMI ABV UP PARAM F/U: HCPCS | Performed by: INTERNAL MEDICINE

## 2020-06-26 PROCEDURE — 4004F PT TOBACCO SCREEN RCVD TLK: CPT | Performed by: INTERNAL MEDICINE

## 2020-06-26 PROCEDURE — G8427 DOCREV CUR MEDS BY ELIG CLIN: HCPCS | Performed by: INTERNAL MEDICINE

## 2020-06-26 RX ORDER — METHOCARBAMOL 750 MG/1
750 TABLET, FILM COATED ORAL 3 TIMES DAILY
Qty: 30 TABLET | Refills: 0 | Status: SHIPPED | OUTPATIENT
Start: 2020-06-26 | End: 2020-07-06

## 2020-06-26 RX ORDER — PREDNISONE 20 MG/1
40 TABLET ORAL DAILY
Qty: 14 TABLET | Refills: 0 | Status: SHIPPED | OUTPATIENT
Start: 2020-06-26 | End: 2020-07-03

## 2020-06-26 ASSESSMENT — PATIENT HEALTH QUESTIONNAIRE - PHQ9
SUM OF ALL RESPONSES TO PHQ9 QUESTIONS 1 & 2: 0
SUM OF ALL RESPONSES TO PHQ QUESTIONS 1-9: 0
SUM OF ALL RESPONSES TO PHQ QUESTIONS 1-9: 0
1. LITTLE INTEREST OR PLEASURE IN DOING THINGS: 0
2. FEELING DOWN, DEPRESSED OR HOPELESS: 0

## 2020-06-26 NOTE — PROGRESS NOTES
and smokes 1 pack daily and dont drink alcohol. 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 Pt dont exercise and feels he eats healthy. Previous PCP dr. Jyotsna Clinton and recent labs. Specialities pt is following with:   GI - chronic stomach pain many years, colonscopy due  [facility] center- ex-heroid addict - 7yr ago, on methadone. No h/o iv drug use. Chronic conditions being monitored:      Concerns today:  Neck pain  Seen in ER on 6/21, diagnosed with trapezius muscle spasm.    Duration-10days  Severity- severe, improving  Context- no other joint problems,   Associated signs and symptoms-  Pins and needles in right arm on turning head to right  Right arm weakness   Modifying factors- flexeril partial help    Alcohol/smoking-    REVIEW OF SYSTEMS (except Subjective (HPI))    CONSTITUTIONAL: No weight loss, malaise or fevers  HEENT: Negative for frequent or significant headaches, No changes in hearing or vision, no nose bleeds or other nasal problems,  NECK: Negative for lumps, goiter, pain and significant neck swelling  RESPIRATORY: Negative for cough, hemoptysis, wheezing, or shortness of breath  CARDIOVASCULAR: Negative for chest pain, leg swelling,or palpitations  GI: No nausea, vomiting, or diarrhea or Constipation  : No history of dysuria, frequency or incontinence, or hematuria  MUSCULOSKELETAL: Negative for joint pain or swelling, positive for neck pain  SKIN: Negative for lesions, rash, and itching  PSYCH: Negative for sleep disturbance, mood disorder and recent psychosocial stressors  NEURO: No history of headaches, syncope, paralysis, seizures or tremors    ACTIVE PROBLEM LIST  Patient Active Problem List   Diagnosis    Constipation    Dental caries    Rectal pain    Gastroesophageal reflux disease without esophagitis    Nausea    Abdominal bloating         PAST MEDICAL HISTORY:    Past Medical History:   Diagnosis Date    Substance abuse ([facility] [address]     detoxing, using Methadone PAST SURGICAL HISTORY:    Past Surgical History:   Procedure Laterality Date    FACIAL COSMETIC SURGERY      from dog bite injury       FAMILY HISTORY:    Family History   Problem Relation Age of Onset    Cancer Other         lung, breast, stomach in grandparents   [de-identified] Other Mother         suicide   [de-identified] Cancer Father         lung        SOCIAL HISTORY:    Social History     Socioeconomic History    Marital status: Single     Spouse name: Not on file    Number of children: Not on file    Years of education: Not on file    Highest education level: Not on file   Occupational History    Not on file   Social Needs    Financial resource strain: Not on file    Food insecurity     Worry: Not on file     Inability: Not on file    Transportation needs     Medical: Not on file     Non-medical: Not on file   Tobacco Use    Smoking status: Current Every Day Smoker     Packs/day: 1.00     Years: 10.00     Pack years: 10.00     Types: Cigarettes    Smokeless tobacco: Never Used   Substance and Sexual Activity    Alcohol use: No    Drug use: Yes     Types: Marijuana     Comment: recovering heroin abuse.     Sexual activity: Yes     Partners: Female   Lifestyle    Physical activity     Days per week: Not on file     Minutes per session: Not on file    Stress: Not on file   Relationships    Social connections     Talks on phone: Not on file     Gets together: Not on file     Attends [employer] service: Not on file     Active member of club or organization: Not on file     Attends meetings of clubs or organizations: Not on file     Relationship status: Not on file    Intimate partner violence     Fear of current or ex partner: Not on file     Emotionally abused: Not on file     Physically abused: Not on file     Forced sexual activity: Not on file   Other Topics Concern    Not on file   Social History Narrative    Not on file       CURRENT MEDICATIONS:  Current Outpatient Medications   Medication Sig Dispense Refill    cyclobenzaprine (FLEXERIL) 5 MG tablet Take 1 tablet by mouth 2 times daily as needed for Muscle spasms 10 tablet 0    methadone (METHADOSE) 40 MG disintegrating tablet Take 12 mg by mouth daily. No current facility-administered medications for this visit. OBJECTIVE:  Vitals:    06/26/20 1550   BP: 118/78   Pulse: 88   Resp: 16   Temp: 98.2 °F (36.8 °C)       Focal exam:  Midline point tenderness over neck, C6-7 region- causes tingling in arm  No bony tenderness around the shoulder joint    General exam (except above):  Constitutional - well appearing, alert, in no acute distress  Eyes: Anicteric sclera. Pupils are equally round and reactive to light. Extraocular movements are intact. Skin: Skin color, texture, turgor normal  Respiratory - Lungs clear to auscultation. No wheezing, rhonchi, rales  Cardiovascular - RRR. S1S2 present. No leg swelling. Gastrointestinal - Abdomen soft, non-tender. Bowel sounds normal. No masses, organomegaly  Musculoskeletal - No joint swelling, deformity, or tenderness  Neuro - Pt Alert, awake and oriented x 3. No gross focal neurological deficits      ASSESSMENT AND PLAN (MEDICAL DECISION MAKING):   There are no diagnoses linked to this encounter. Cici Mccain was seen today for establish care. Diagnoses and all orders for this visit:    Neuropathy, arm, right  -     MRI CERVICAL SPINE W WO CONTRAST; Future  -     predniSONE (DELTASONE) 20 MG tablet; Take 2 tablets by mouth daily for 7 days  -     methocarbamol (ROBAXIN-750) 750 MG tablet; Take 1 tablet by mouth 3 times daily for 10 days    Trapezius muscle spasm  -     MRI CERVICAL SPINE W WO CONTRAST; Future  -     predniSONE (DELTASONE) 20 MG tablet; Take 2 tablets by mouth daily for 7 days  -     methocarbamol (ROBAXIN-750) 750 MG tablet;  Take 1 tablet by mouth 3 times daily for 10 days         Follow up in: 1week      Katerine Hoover MD

## 2020-06-27 NOTE — ED PROVIDER NOTES
Emergency Medicine Attending Note    I have seen and examined the patient in conjunction with the Resident/MLP. Please see my key portion documented:      I agree with the assessment and plan as discussed with Dr. Gorden Meckel. Electronically signed:  JOAQUÍN Aldrich MD  06/27/20 2018

## 2020-07-02 ENCOUNTER — TELEPHONE (OUTPATIENT)
Dept: INTERNAL MEDICINE CLINIC | Age: 35
End: 2020-07-02

## 2020-07-02 NOTE — TELEPHONE ENCOUNTER
Per [facility] Radiology dept MRI CERVICAL SPINE W WO CONTRAST ordered by Dr. Isis Yao should only be ordered w/out contrast.    It's very rare that cervical spine is w and w/out. If looking for neuropathy there is no reason. Test reordered.

## 2020-07-06 ENCOUNTER — HOSPITAL ENCOUNTER (OUTPATIENT)
Dept: MRI IMAGING | Facility: CLINIC | Age: 35
Discharge: HOME | End: 2020-07-08
Payer: MEDICAID

## 2020-07-06 PROCEDURE — 72141 MRI NECK SPINE W/O DYE: CPT

## 2020-07-07 ENCOUNTER — TELEPHONE (OUTPATIENT)
Dept: INTERNAL MEDICINE CLINIC | Age: 35
End: 2020-07-07

## 2020-07-07 RX ORDER — GABAPENTIN 100 MG/1
100 CAPSULE ORAL 2 TIMES DAILY
Qty: 60 CAPSULE | Refills: 1 | Status: SHIPPED | OUTPATIENT
Start: 2020-07-07 | End: 2022-03-29

## 2020-07-07 NOTE — TELEPHONE ENCOUNTER
Please inform patient his neck MRI does show some disc protrusion and nerve irritation at the level of the lower neck as discussed during office visit. I am referring him to Dr. Nickie Holter for further assessment of his spine. Please help him set up an appt. In the meantime, if his pain is not controlled, he can try neurontin- I am sending to his pharmacy. Patient to be made aware that this medication can cause drowsiness, he should not drive or operate heavy machinery if the medication is making him drowsy.        Fatuma Hinojosa

## 2020-07-09 ENCOUNTER — OFFICE VISIT (OUTPATIENT)
Dept: INTERNAL MEDICINE CLINIC | Age: 35
End: 2020-07-09
Payer: MEDICAID

## 2020-07-09 VITALS
HEIGHT: 72 IN | BODY MASS INDEX: 36.03 KG/M2 | DIASTOLIC BLOOD PRESSURE: 78 MMHG | OXYGEN SATURATION: 96 % | HEART RATE: 96 BPM | TEMPERATURE: 98 F | SYSTOLIC BLOOD PRESSURE: 124 MMHG | WEIGHT: 266 LBS

## 2020-07-09 PROCEDURE — 99212 OFFICE O/P EST SF 10 MIN: CPT | Performed by: INTERNAL MEDICINE

## 2020-07-09 PROCEDURE — G8417 CALC BMI ABV UP PARAM F/U: HCPCS | Performed by: INTERNAL MEDICINE

## 2020-07-09 PROCEDURE — G8427 DOCREV CUR MEDS BY ELIG CLIN: HCPCS | Performed by: INTERNAL MEDICINE

## 2020-07-09 PROCEDURE — 4004F PT TOBACCO SCREEN RCVD TLK: CPT | Performed by: INTERNAL MEDICINE

## 2020-07-09 ASSESSMENT — PATIENT HEALTH QUESTIONNAIRE - PHQ9
SUM OF ALL RESPONSES TO PHQ QUESTIONS 1-9: 0
SUM OF ALL RESPONSES TO PHQ QUESTIONS 1-9: 0
2. FEELING DOWN, DEPRESSED OR HOPELESS: 0
1. LITTLE INTEREST OR PLEASURE IN DOING THINGS: 0
SUM OF ALL RESPONSES TO PHQ9 QUESTIONS 1 & 2: 0

## 2020-07-09 NOTE — PROGRESS NOTES
Chief Complaint   Patient presents with    Results     Review MRI: Pt was not able to have labs done        Visit Information    Have you changed or started any medications since your last visit including any over-the-counter medicines, vitamins, or herbal medicines? no   Are you having any side effects from any of your medications? -  no  Have you stopped taking any of your medications? Is so, why? -  no    Have you seen any other physician or provider since your last visit? No  Have you had any other diagnostic tests since your last visit? Yes - Records Obtained  Have you been seen in the emergency room and/or had an admission to a hospital since we last saw you? No  Have you had your routine dental cleaning in the past 6 months? no    Have you activated your [employer] account? If not, what are your barriers? Yes     Patient Care Team:  Candance Chol, MD as PCP - General (Internal Medicine)  Candance Chol, MD as PCP - [facility] Provider    Medical History Review  Past Medical, Family, and Social History reviewed and does not contribute to the patient presenting condition    Health Maintenance   Topic Date Due    Varicella vaccine (1 of 2 - 2-dose childhood series) 06/07/1984    Flu vaccine (1) 09/01/2020    DTaP/Tdap/Td vaccine (3 - Td) 03/13/2028    Pneumococcal 0-64 years Vaccine  Completed    HIV screen  Completed    Hepatitis A vaccine  Aged Out    Hepatitis B vaccine  Aged Out    Hib vaccine  Aged Out    Meningococcal (ACWY) vaccine  Aged Out     SUBJECTIVE:  Natasha Bennett is a 38 y.o. male patient who  comes for complaints of   Chief Complaint   Patient presents with    Results     Review MRI: Pt was not able to have labs done        Was seen for right side trapezius muscle spasm and arm neuropathy 2 wk aog  Treated with po prednisone  All symptoms resolved.        MRI shows disc protrusion\discussed      REVIEW OF SYSTEMS (except Subjective (HPI))  GENERAL: No fevers / chills  RESPIRATORY: Negative for cough, wheezing or shortness of breath  CARDIOVASCULAR: Negative for chest pain or palpitations. GI: no nausea, vomiting, or diarrhea  NEURO: No history of headaches    Past Medical History:   Diagnosis Date    Substance abuse ([facility] [address]     detoxing, using Methadone        SOCIAL HISTORY:  Social History     Socioeconomic History    Marital status: Single     Spouse name: Not on file    Number of children: Not on file    Years of education: Not on file    Highest education level: Not on file   Occupational History    Not on file   Social Needs    Financial resource strain: Not on file    Food insecurity     Worry: Not on file     Inability: Not on file    Transportation needs     Medical: Not on file     Non-medical: Not on file   Tobacco Use    Smoking status: Current Every Day Smoker     Packs/day: 1.00     Years: 10.00     Pack years: 10.00     Types: Cigarettes    Smokeless tobacco: Never Used   Substance and Sexual Activity    Alcohol use: No    Drug use: Yes     Types: Marijuana     Comment: recovering heroin abuse.     Sexual activity: Yes     Partners: Female   Lifestyle    Physical activity     Days per week: Not on file     Minutes per session: Not on file    Stress: Not on file   Relationships    Social connections     Talks on phone: Not on file     Gets together: Not on file     Attends [employer] service: Not on file     Active member of club or organization: Not on file     Attends meetings of clubs or organizations: Not on file     Relationship status: Not on file    Intimate partner violence     Fear of current or ex partner: Not on file     Emotionally abused: Not on file     Physically abused: Not on file     Forced sexual activity: Not on file   Other Topics Concern    Not on file   Social History Narrative    Not on file           CURRENT MEDICATIONS:  Current Outpatient Medications   Medication Sig Dispense Refill    gabapentin (NEURONTIN) 100 MG capsule Take 1

## 2020-07-21 ENCOUNTER — HOSPITAL ENCOUNTER (EMERGENCY)
Age: 35
Discharge: HOME | End: 2020-07-21
Attending: EMERGENCY MEDICINE
Payer: MEDICAID

## 2020-07-21 ENCOUNTER — APPOINTMENT (OUTPATIENT)
Dept: GENERAL RADIOLOGY | Age: 35
End: 2020-07-21
Payer: MEDICAID

## 2020-07-21 VITALS
OXYGEN SATURATION: 99 % | SYSTOLIC BLOOD PRESSURE: 145 MMHG | BODY MASS INDEX: 35.89 KG/M2 | HEIGHT: 72 IN | RESPIRATION RATE: 18 BRPM | DIASTOLIC BLOOD PRESSURE: 75 MMHG | WEIGHT: 265 LBS | TEMPERATURE: 98.4 F | HEART RATE: 88 BPM

## 2020-07-21 PROCEDURE — 71045 X-RAY EXAM CHEST 1 VIEW: CPT

## 2020-07-21 PROCEDURE — 93005 ELECTROCARDIOGRAM TRACING: CPT | Performed by: EMERGENCY MEDICINE

## 2020-07-21 PROCEDURE — 99285 EMERGENCY DEPT VISIT HI MDM: CPT

## 2020-07-21 RX ORDER — METHADONE HYDROCHLORIDE 5 MG/5ML
11 SOLUTION ORAL DAILY
COMMUNITY
End: 2022-03-29

## 2020-07-21 ASSESSMENT — ENCOUNTER SYMPTOMS
SHORTNESS OF BREATH: 1
EYES NEGATIVE: 1
GASTROINTESTINAL NEGATIVE: 1
ABDOMINAL PAIN: 0
COUGH: 1

## 2020-07-21 ASSESSMENT — PAIN SCALES - GENERAL: PAINLEVEL_OUTOF10: 5

## 2020-07-21 NOTE — ED PROVIDER NOTES
K62.89    Gastroesophageal reflux disease without esophagitis K21.9    Nausea R11.0    Abdominal bloating R14.0     SURGICAL HISTORY       Past Surgical History:   Procedure Laterality Date    FACIAL COSMETIC SURGERY      from dog bite injury     CURRENT MEDICATIONS       Discharge Medication List as of 2020 10:06 AM      CONTINUE these medications which have NOT CHANGED    Details   methadone 5 MG/5ML solution Take 11 mg by mouth daily. Historical Med      gabapentin (NEURONTIN) 100 MG capsule Take 1 capsule by mouth 2 times daily for 30 days. , Disp-60 capsule, R-1Normal      methadone (METHADOSE) 40 MG disintegrating tablet Take 12 mg by mouth daily. Historical Med           ALLERGIES     is allergic to codeine. FAMILY HISTORY     He indicated that his mother is . He indicated that his father is . He indicated that his maternal grandmother is . He indicated that his maternal grandfather is . He indicated that his paternal grandmother is . He indicated that his paternal grandfather is . He indicated that the status of his other is unknown. SOCIAL HISTORY       Social History     Tobacco Use    Smoking status: Current Every Day Smoker     Packs/day: 1.00     Years: 25.00     Pack years: 25.00     Types: Cigarettes    Smokeless tobacco: Never Used   Substance Use Topics    Alcohol use: No    Drug use: Yes     Types: Marijuana     Comment: pt uses cannabis; currently [facility]; previous snorting heroin      PHYSICAL EXAM     INITIAL VITALS: BP (!) 145/75   Pulse 88   Temp 98.4 °F (36.9 °C) (Oral)   Resp 18   Ht 6' (1.829 m)   Wt 265 lb (120.2 kg)   SpO2 99%   BMI 35.94 kg/m²    Physical Exam  Vitals signs and nursing note reviewed. Constitutional:       Appearance: Normal appearance. HENT:      Head: Normocephalic and atraumatic. Nose: No congestion.       Mouth/Throat:      Mouth: Mucous membranes are moist. this time there is a critical shortage of SARS-Coronavirus-2 PCR testing, very limited criteria for testing, and we are unable to test the patient at this time since criteria is not met. Direct patient contact is avoided at this time due to the critically low supplies of PPE worldwide and an effort to fernando appropriate use of PPE. I performed a medical screening exam that is compliant with the Declaration of [facility] [place], secondary to the Pandemic Infectious Disease of SARS-Coronavirus-2. We are not testing for influenza or other viral etiologies at this time due to the significant evidence of virus coinfections during this pandemic. The patient is referred to appropriate outpatient follow up through their PCP or [facility]. I discussed with the patient home isolation measures, anticipatory guidance, discharge instructions, and to return immediately for worsening of symptoms. The patient is agreeable with this plan. CRITICAL CARE:       PROCEDURES:    Procedures    DIAGNOSTIC RESULTS   EKG:All EKG's are interpreted by the Emergency Department Physician who either signs or Co-signs this chart in the absence of a cardiologist.    Ekg, rate of 81, nsr, no st seg changes, normal pr and qrs, normal ekg. RADIOLOGY:All plain film, CT, MRI, and formal ultrasound images (except ED bedside ultrasound) are read by the radiologist, see reports below, unless otherwisenoted in MDM or here. XR CHEST PORTABLE   Final Result   Mildly prominent interstitial change seen at the lung bases, however this is   similar to 2017. Minimal superimposed atelectasis at the left lung base. No   focal consolidation or pleural effusion is identified. No acute osseous   abnormality. LABS: All lab results were reviewed by myself, and all abnormals are listed below.   Labs Reviewed - No data to display    EMERGENCY DEPARTMENTCOURSE:         Vitals:    Vitals:    07/21/20 0856   BP: (!) 145/75   Pulse: 88   Resp: 18   Temp: 98.4 °F (36.9 °C)   TempSrc: Oral   SpO2: 99%   Weight: 265 lb (120.2 kg)   Height: 6' (1.829 m)       The patient was given the following medications while in the emergency department:  No orders of the defined types were placed in this encounter. CONSULTS:  None    FINAL IMPRESSION      1. Chest pain, unspecified type    2.  Viral syndrome          DISPOSITION/PLAN   DISPOSITION Decision To Discharge 07/21/2020 10:05:44 AM      PATIENT REFERRED TO:  Kingston Vitale MD  [address]  [phone]    Call in 1 day      DISCHARGE MEDICATIONS:  Discharge Medication List as of 7/21/2020 10:06 AM        Yaima Walton MD  Attending Emergency Physician                   Cinda Carlos MD  07/21/20 8705

## 2020-07-21 NOTE — ACP (ADVANCE CARE PLANNING)
Advance Care Planning     Advance Care Planning Activator (Inpatient)  Conversation Note      Date of ACP Conversation: 7/21/2020    Conversation Conducted with: Patient with Decision Making Capacity    ACP Activator: [address] Butler makes decisions on behalf of the incapacitated patient: Decision Maker is asked to consider and make decisions based on patient values, known preferences, or best interests. Health Care Decision Maker:     Current Designated Health Care Decision Maker:   (If there is a valid [place] named in the [address] Makers\" box in the ACP activity, but it is not visible above, be sure to open that field and then select the health care decision maker relationship (ie \"primary\") in the blank space to the right of the name.) Validate  this information as still accurate & up-to-date; edit [place] field as needed.)    Note: Assess and validate information in current ACP documents, as indicated. If no Decision Maker listed above or available through scanned documents, then:    If no Authorized Decision Maker has previously been identified, then patient chooses [place]:  \"Who would you like to name as your primary health care decision-maker? \"               Name: Anais Moscoso        Relationship: Girlfriend          Phone number: [phone]  Alessandra Sean this person be reached easily? \" Yes    Note: If the relationship of these Decision-Makers to the patient does NOT follow your state's Next of Kin hierarchy, recommend that patient complete ACP document that meets state-specific requirements to allow them to act on the patient's behalf when appropriate. Care Preferences    Ventilation: \"If you were in your present state of health and suddenly became very ill and were unable to breathe on your own, what would your preference be about the use of a ventilator (breathing machine) if it were available to you? \" Would the patient desire the use of ventilator (breathing machine)?: yes    \"If your health worsens and it becomes clear that your chance of recovery is unlikely, what would your preference be about the use of a ventilator (breathing machine) if it were available to you? \"     Would the patient desire the use of ventilator (breathing machine)?: Yes      Resuscitation  \"CPR works best to restart the heart when there is a sudden event, like a heart attack, in someone who is otherwise healthy. Unfortunately, CPR does not typically restart the heart for people who have serious health conditions or who are very sick. \"    \"In the event your heart stopped as a result of an underlying serious health condition, would you want attempts to be made to restart your heart (answer \"yes\" for attempt to resuscitate) or would you prefer a natural death (answer \"no\" for do not attempt to resuscitate)? \" yes      NOTE: If the patient has a valid advance directive AND now provides care preference(s) that are inconsistent with that prior directive, advise the patient to consider either: creating a new advance directive that complies with state-specific requirements; or, if that is not possible, orally revoking that prior directive in accordance with state-specific requirements, which must be documented in the EHR. [x] Yes   [] No   Educated Patient / Chanelle Schmidts regarding differences between Advance Directives and portable DNR orders.     Length of ACP Conversation in minutes:  15    Conversation Outcomes:  [x] ACP discussion completed  [] Existing advance directive reviewed with patient; no changes to patient's previously recorded wishes  [] New Advance Directive completed  [] Portable Do Not Rescitate prepared for Provider review and signature  [] POLST/POST/MOLST/MOST prepared for Provider review and signature      Follow-up plan:    [] Schedule follow-up conversation to continue planning  [] Referred individual to Provider for additional questions/concerns   [] Advised patient/agent/surrogate to review completed ACP document and update if needed with changes in condition, patient preferences or care setting    [] This note routed to one or more involved healthcare providers

## 2020-07-21 NOTE — ED NOTES
Mode of arrival (squad #, walk in, police, etc) : walk in        Chief complaint(s): SOB, cough, fatigue        Arrival Note (brief scenario, treatment PTA, etc). : patient states for the past 4 days he has had a dry, hacking cough, SOB with exertion, and increased fatigue. Patient states he hasn't had much energy recently. Patient states he is a daily smoker, but has cut back since his symptoms increased 4 days ago. Patient states he was informed that a coworker tested positive for COVID and is currently hospitalized for it. Patient states he has been in close contact with this person within the past week to 2 weeks. Patient denies any fevers, chills, nausea, vomiting, or abdominal pain. Patient is alert and oriented x4.         C= \"Have you ever felt that you should Cut down on your drinking? \"  No  A= \"Have people Annoyed you by criticizing your drinking? \"  No  G= \"Have you ever felt bad or Guilty about your drinking? \"  No  E= \"Have you ever had a drink as an Eye-opener first thing in the morning to steady your nerves or to help a hangover? \"  No      Deferred []      Reason for deferring: N/A    *If yes to two or more: probable alcohol abuse. William Duncan RN  07/21/20 2088

## 2020-07-22 ENCOUNTER — CARE COORDINATION (OUTPATIENT)
Dept: CARE COORDINATION | Age: 35
End: 2020-07-22

## 2020-07-22 LAB
EKG ATRIAL RATE: 81 BPM
EKG P AXIS: 60 DEGREES
EKG P-R INTERVAL: 180 MS
EKG Q-T INTERVAL: 362 MS
EKG QRS DURATION: 100 MS
EKG QTC CALCULATION (BAZETT): 420 MS
EKG R AXIS: 38 DEGREES
EKG T AXIS: 22 DEGREES
EKG VENTRICULAR RATE: 81 BPM

## 2020-07-22 NOTE — CARE COORDINATION
Patient contacted regarding COVID-19 exposure. Discussed COVID-19 related testing which was pending at this time. Test results were pending. Patient informed of results, if available? Pending    Care Transition Nurse/ Ambulatory Care Manager contacted the patient by telephone to perform post discharge assessment. Verified name and  with patient as identifiers. Provided introduction to self, and explanation of the CTN/ACM role, and reason for call due to risk factors for infection and/or exposure to COVID-19. Symptoms reviewed with patient who verbalized the following symptoms: fatigue, cough, shortness of breath and chest pain. Due to no new or worsening symptoms encounter was not routed to provider for escalation. Discussed follow-up appointments. If no appointment was previously scheduled, appointment scheduling offered: Yes  [facility] follow up appointment(s):   Future Appointments   Date Time Provider Cordell Gómez   2020  8:50 AM Mer Wu MD [facility]     Non-[facility] follow up appointment(s): N/A     Advance Care Planning:   Does patient have an Advance Directive:  not on file; education provided. Patient has following risk factors of: no known risk factors. CTN/ACM reviewed discharge instructions, medical action plan and red flags such as increased shortness of breath, increasing fever and signs of decompensation with patient who verbalized understanding. Discussed exposure protocols and quarantine with CDC Guidelines What to do if you are sick with coronavirus disease .  Patient was given an opportunity for questions and concerns. The patient agrees to contact the [facility] exposure line [phone], local [facility] department [place] [facility]: ([phone]) and PCP office for questions related to their healthcare. CTN/ACM provided contact information for future needs.     Reviewed and educated patient on any new and changed medications related to discharge diagnosis Patient/family/caregiver given information for [employer] and agrees to enroll yes  Patient's preferred e-mail: [email or web address]   Patient's preferred phone number: [phone]  Based on Loop alert triggers, patient will be contacted by nurse care manager for worsening symptoms. Pt will be further monitored by COVID Loop Team based on severity of symptoms and risk factors.

## 2020-07-28 ENCOUNTER — OFFICE VISIT (OUTPATIENT)
Dept: ORTHOPEDIC SURGERY | Age: 35
End: 2020-07-28
Payer: MEDICAID

## 2020-07-28 PROCEDURE — G8417 CALC BMI ABV UP PARAM F/U: HCPCS | Performed by: ORTHOPAEDIC SURGERY

## 2020-07-28 PROCEDURE — 99203 OFFICE O/P NEW LOW 30 MIN: CPT | Performed by: ORTHOPAEDIC SURGERY

## 2020-07-28 PROCEDURE — G8428 CUR MEDS NOT DOCUMENT: HCPCS | Performed by: ORTHOPAEDIC SURGERY

## 2020-07-28 PROCEDURE — 4004F PT TOBACCO SCREEN RCVD TLK: CPT | Performed by: ORTHOPAEDIC SURGERY

## 2020-07-28 NOTE — PROGRESS NOTES
Patient ID: Cristhian Murdock is a 38 y.o. male    Chief Compliant:  Chief Complaint   Patient presents with    Established New Doctor    Pain     neck pain down right arm         History of Present Illness: This is a 38 y.o. male who presents to the clinic today for evaluation of neck pain that radiates down his right arm since one month ago. He reports today that his neck and shoulder pain has resolved      Review of Systems   Constitutional: Negative for fever, chills, sweats, or weight loss. Eyes: Negative for changes in vision, pain. HENT: Negative for congestion, bleeding, or pain. Respiratory/Cardio: Negative for chest pain, SOB. Gastrointestinal:  Negative for abdominal pain, Nausea/vomiting/diarrhea. Genitourinary: Negative for any urinary symptoms. Neurological: Negative for paresthesia, asthenia. Integumentary: Negative for rash, wounds, itching, ecchymosis. Musculoskeletal: Positive for Established New Doctor and Pain (neck pain down right arm )       Past History:    Current Outpatient Medications:     methadone 5 MG/5ML solution, Take 11 mg by mouth daily. , Disp: , Rfl:     gabapentin (NEURONTIN) 100 MG capsule, Take 1 capsule by mouth 2 times daily for 30 days. , Disp: 60 capsule, Rfl: 1    methadone (METHADOSE) 40 MG disintegrating tablet, Take 12 mg by mouth daily.  , Disp: , Rfl:   Allergies   Allergen Reactions    Codeine Nausea And Vomiting     Social History     Socioeconomic History    Marital status: Single     Spouse name: Not on file    Number of children: Not on file    Years of education: Not on file    Highest education level: Not on file   Occupational History    Not on file   Social Needs    Financial resource strain: Not on file    Food insecurity     Worry: Not on file     Inability: Not on file    Transportation needs     Medical: Not on file     Non-medical: Not on file   Tobacco Use    Smoking status: Current Every Day Smoker     Packs/day: 1.00 Years: 25.00     Pack years: 25.00     Types: Cigarettes    Smokeless tobacco: Never Used   Substance and Sexual Activity    Alcohol use: No    Drug use: Yes     Types: Marijuana     Comment: pt uses cannabis; currently taking [facility]adone from [facility]; previous snorting heroin     Sexual activity: Yes     Partners: Female   Lifestyle    Physical activity     Days per week: Not on file     Minutes per session: Not on file    Stress: Not on file   Relationships    Social connections     Talks on phone: Not on file     Gets together: Not on file     Attends [employer] service: Not on file     Active member of club or organization: Not on file     Attends meetings of clubs or organizations: Not on file     Relationship status: Not on file    Intimate partner violence     Fear of current or ex partner: Not on file     Emotionally abused: Not on file     Physically abused: Not on file     Forced sexual activity: Not on file   Other Topics Concern    Not on file   Social History Narrative    Not on file     Past Medical History:   Diagnosis Date    Cervical disc herniation     C7 herniation    Substance abuse ([facility] [address]     pt uses cannabis; currently taking Methadone from [facility]; previous snorting heroin      Past Surgical History:   Procedure Laterality Date    FACIAL COSMETIC SURGERY      from dog bite injury     Family History   Problem Relation Age of Onset    Cancer Other         lung, breast, stomach in grandparents   [facility] Other Mother         suicide    Cancer Father         lung        Physical Exam:  Constitutional: Patient is oriented to person, place, and time. Patient appears well-developed and well nourished. HENT: Negative otherwise noted  Head: Normocephalic and Atraumatic  Nose: Normal  Eyes: Conjunctivae and EOM are normal  Neck: Normal range of motion Neck supple. Respiratory/Cardio: Effort normal. No respiratory distress. Musculoskeletal: Normal gait.    Neurological: Patient is alert and oriented to person, place, and time. Normal strenght. No sensory deficit. Skin: Skin is warm and dry  Psychiatric: Behavior is normal. Thought content normal.  Nursing note and vitals reviewed. Labs and Imaging:     MRI cervical spine is reviewed mild disc bulge at C6-7 age-appropriate no significant nerve impingement        Assessment and Plan:  1. Neck pain         Pain is resolved    Follow up as needed    Scribe Attestation:   By signing my name below, I, Bruce Hand, attest that this documentation has been prepared under the direction and in the presence of Ruth Ann Vargas MD.     Electronically Signed: Bruce Hand. 7/28/20. 8:57 AM EDT. Provider Attestation:  Conrado Hollis, personally performed the services described in this documentation. All medical record entries made by the scribe were at my direction and in my presence. I have reviewed the chart and discharge instructions and agree that the records reflect my personal performance and is accurate and complete. [place] Luc Lawson MD 07/28/20        Please note that this chart was generated using voice recognition Dragon dictation software. Although every effort was made to ensure the accuracy of this automated transcription, some errors in transcription may have occurred.

## 2021-01-26 ENCOUNTER — OFFICE VISIT (OUTPATIENT)
Dept: INTERNAL MEDICINE CLINIC | Age: 36
End: 2021-01-26
Payer: MEDICAID

## 2021-01-26 VITALS
HEART RATE: 76 BPM | DIASTOLIC BLOOD PRESSURE: 84 MMHG | BODY MASS INDEX: 37.79 KG/M2 | HEIGHT: 72 IN | SYSTOLIC BLOOD PRESSURE: 126 MMHG | RESPIRATION RATE: 16 BRPM | TEMPERATURE: 98.7 F | WEIGHT: 279 LBS

## 2021-01-26 DIAGNOSIS — B36.9 FUNGAL INFECTION OF SKIN: Primary | ICD-10-CM

## 2021-01-26 DIAGNOSIS — E66.9 OBESITY, CLASS II, BMI 35-39.9: ICD-10-CM

## 2021-01-26 DIAGNOSIS — L29.0 RECTAL ITCHING: ICD-10-CM

## 2021-01-26 DIAGNOSIS — Z00.00 HEALTH CARE MAINTENANCE: ICD-10-CM

## 2021-01-26 PROCEDURE — G8427 DOCREV CUR MEDS BY ELIG CLIN: HCPCS | Performed by: INTERNAL MEDICINE

## 2021-01-26 PROCEDURE — G8484 FLU IMMUNIZE NO ADMIN: HCPCS | Performed by: INTERNAL MEDICINE

## 2021-01-26 PROCEDURE — 4004F PT TOBACCO SCREEN RCVD TLK: CPT | Performed by: INTERNAL MEDICINE

## 2021-01-26 PROCEDURE — 99213 OFFICE O/P EST LOW 20 MIN: CPT | Performed by: INTERNAL MEDICINE

## 2021-01-26 PROCEDURE — G8417 CALC BMI ABV UP PARAM F/U: HCPCS | Performed by: INTERNAL MEDICINE

## 2021-01-26 RX ORDER — NYSTATIN 100000 [USP'U]/G
POWDER TOPICAL
Qty: 60 G | Refills: 1 | Status: SHIPPED | OUTPATIENT
Start: 2021-01-26 | End: 2022-03-29

## 2021-01-26 ASSESSMENT — PATIENT HEALTH QUESTIONNAIRE - PHQ9
1. LITTLE INTEREST OR PLEASURE IN DOING THINGS: 0
SUM OF ALL RESPONSES TO PHQ QUESTIONS 1-9: 0
SUM OF ALL RESPONSES TO PHQ QUESTIONS 1-9: 0

## 2021-01-26 NOTE — PROGRESS NOTES
Visit Information    Have you changed or started any medications since your last visit including any over-the-counter medicines, vitamins, or herbal medicines? no   Are you having any side effects from any of your medications? -  no  Have you stopped taking any of your medications? Is so, why? -  no    Have you seen any other physician or provider since your last visit? No  Have you had any other diagnostic tests since your last visit? No  Have you been seen in the emergency room and/or had an admission to a hospital since we last saw you? Yes - Records Obtained  Have you had your routine dental cleaning in the past 6 months? no    Have you activated your [employer] account? If not, what are your barriers? Yes     Patient Care Team:  Carrol Madrid MD as PCP - General (Internal Medicine)  Carrol Madrid MD as PCP - [facility]    Medical History Review  Past Medical, Family, and Social History reviewed and does contribute to the patient presenting condition    Health Maintenance   Topic Date Due    Varicella vaccine (1 of 2 - 2-dose childhood series) 06/07/1984    Flu vaccine (1) 09/01/2020    DTaP/Tdap/Td vaccine (3 - Td) 03/13/2028    Pneumococcal 0-64 years Vaccine  Completed    Hepatitis C screen  Completed    HIV screen  Completed    Hepatitis A vaccine  Aged Out    Hepatitis B vaccine  Aged Out    Hib vaccine  Aged Out    Meningococcal (ACWY) vaccine  Aged Out     Chief Complaint   Patient presents with    Anal Itching     Pt is here with c/o rectal itching, some burning with bowel movement with no other symptoms. Onset 7 days. Pt was seen at urgent care saturday and was given antifungal cream for groin . Pt denies any other concerns at this time.      SUBJECTIVE:  Abdifatah Adair is a 38 y.o. male patient who  comes for complaints of   Chief Complaint   Patient presents with    Anal Itching  Alcohol use: No    Drug use: Yes     Types: Marijuana     Comment: pt uses cannabis; currently taking Methadone from [facility]; previous snorting heroin     Sexual activity: Yes     Partners: Female   Lifestyle    Physical activity     Days per week: Not on file     Minutes per session: Not on file    Stress: Not on file   Relationships    Social connections     Talks on phone: Not on file     Gets together: Not on file     Attends [employer] service: Not on file     Active member of club or organization: Not on file     Attends meetings of clubs or organizations: Not on file     Relationship status: Not on file    Intimate partner violence     Fear of current or ex partner: Not on file     Emotionally abused: Not on file     Physically abused: Not on file     Forced sexual activity: Not on file   Other Topics Concern    Not on file   Social History Narrative    Not on file           CURRENT MEDICATIONS:  Current Outpatient Medications   Medication Sig Dispense Refill    methadone 5 MG/5ML solution Take 11 mg by mouth daily.  methadone (METHADOSE) 40 MG disintegrating tablet Take 12 mg by mouth daily.  gabapentin (NEURONTIN) 100 MG capsule Take 1 capsule by mouth 2 times daily for 30 days. 60 capsule 1     No current facility-administered medications for this visit. OBJECTIVE:  Vitals:    01/26/21 0958   BP: 126/84   Pulse: 76   Resp: 16   Temp: 98.7 °F (37.1 °C)     Body mass index is 37.84 kg/m². Focal exam:  Erythematous skin of intertrigionous zones  No hemorrhoids    General exam (except above):  General appearance - well appearing, alert, in no acute distress  Head - Atraumatic, normocephalic  Eyes - EOMI, no jaundice or pallor  Extremities -No significant edema, or skin discoloration. Good capillary refill. Neuro - Pt Alert, awake and oriented x 3.  No gross focal neurological deficits    ASSESSMENT AND PLAN (MEDICAL DECISION MAKING): Darren Paul was seen today for anal itching. Diagnoses and all orders for this visit:    Fungal infection of skin  -     nystatin (MYCOSTATIN) 746759 UNIT/GM powder; Apply 3 times daily. Use for 1 wk after rash has cleared. Rectal itching    Health care maintenance  Comments:  Due flu vaccine. Orders:  -     Hemoglobin A1C; Future  -     Lipid, Fasting;  Future    Obesity, Class II, BMI 35-39.9  -     Hemoglobin A1C; Future           Follow up in: gill Lieberman MD

## 2021-11-11 ENCOUNTER — TELEPHONE (OUTPATIENT)
Dept: INTERNAL MEDICINE CLINIC | Age: 36
End: 2021-11-11

## 2021-11-11 NOTE — TELEPHONE ENCOUNTER
Aleksander on pts phone regarding his no show appt [facility]/ Dr. Vanessa Lazo on 11/11/21. Ltr mailed.

## 2022-03-29 ENCOUNTER — APPOINTMENT (OUTPATIENT)
Dept: GENERAL RADIOLOGY | Age: 37
End: 2022-03-29
Payer: MEDICARE

## 2022-03-29 ENCOUNTER — HOSPITAL ENCOUNTER (EMERGENCY)
Age: 37
Discharge: HOME | End: 2022-03-29
Attending: EMERGENCY MEDICINE
Payer: MEDICARE

## 2022-03-29 VITALS
BODY MASS INDEX: 37.93 KG/M2 | HEIGHT: 72 IN | SYSTOLIC BLOOD PRESSURE: 118 MMHG | WEIGHT: 280 LBS | DIASTOLIC BLOOD PRESSURE: 73 MMHG | RESPIRATION RATE: 19 BRPM | HEART RATE: 67 BPM | TEMPERATURE: 98.3 F | OXYGEN SATURATION: 97 %

## 2022-03-29 DIAGNOSIS — B34.9 VIRAL SYNDROME: Primary | ICD-10-CM

## 2022-03-29 LAB
ABSOLUTE EOS #: 0.2 K/UL (ref 0–0.4)
ABSOLUTE LYMPH #: 1.6 K/UL (ref 1–4.8)
ABSOLUTE MONO #: 0.5 K/UL (ref 0.1–1.3)
ALBUMIN SERPL-MCNC: 4.2 G/DL (ref 3.5–5.2)
ALP BLD-CCNC: 81 U/L (ref 40–129)
ALT SERPL-CCNC: 22 U/L (ref 5–41)
ANION GAP SERPL CALCULATED.3IONS-SCNC: 9 MMOL/L (ref 9–17)
AST SERPL-CCNC: 20 U/L
BASOPHILS # BLD: 1 % (ref 0–2)
BASOPHILS ABSOLUTE: 0.1 K/UL (ref 0–0.2)
BILIRUB SERPL-MCNC: 0.58 MG/DL (ref 0.3–1.2)
BILIRUBIN URINE: NEGATIVE
BUN BLDV-MCNC: 9 MG/DL (ref 6–20)
CALCIUM SERPL-MCNC: 9.3 MG/DL (ref 8.6–10.4)
CHLORIDE BLD-SCNC: 99 MMOL/L (ref 98–107)
CO2: 25 MMOL/L (ref 20–31)
COLOR: YELLOW
COMMENT UA: ABNORMAL
CREAT SERPL-MCNC: 0.8 MG/DL (ref 0.7–1.2)
EOSINOPHILS RELATIVE PERCENT: 3 % (ref 0–4)
GFR AFRICAN AMERICAN: >60 ML/MIN
GFR NON-AFRICAN AMERICAN: >60 ML/MIN
GFR SERPL CREATININE-BSD FRML MDRD: ABNORMAL ML/MIN/{1.73_M2}
GLUCOSE BLD-MCNC: 93 MG/DL (ref 70–99)
GLUCOSE URINE: NEGATIVE
HCT VFR BLD CALC: 44 % (ref 41–53)
HEMOGLOBIN: 14.9 G/DL (ref 13.5–17.5)
INFLUENZA A: NOT DETECTED
INFLUENZA B: NOT DETECTED
KETONES, URINE: NEGATIVE
LEUKOCYTE ESTERASE, URINE: NEGATIVE
LIPASE: 9 U/L (ref 13–60)
LYMPHOCYTES # BLD: 20 % (ref 24–44)
MCH RBC QN AUTO: 27.4 PG (ref 26–34)
MCHC RBC AUTO-ENTMCNC: 33.7 G/DL (ref 31–37)
MCV RBC AUTO: 81.1 FL (ref 80–100)
MONOCYTES # BLD: 6 % (ref 1–7)
NITRITE, URINE: NEGATIVE
PDW BLD-RTO: 13.7 % (ref 11.5–14.9)
PH UA: 8.5 (ref 5–8)
PLATELET # BLD: 223 K/UL (ref 150–450)
PMV BLD AUTO: 7.9 FL (ref 6–12)
POTASSIUM SERPL-SCNC: 3.7 MMOL/L (ref 3.7–5.3)
PROTEIN UA: NEGATIVE
RBC # BLD: 5.43 M/UL (ref 4.5–5.9)
SARS-COV-2 RNA, RT PCR: NOT DETECTED
SEG NEUTROPHILS: 70 % (ref 36–66)
SEGMENTED NEUTROPHILS ABSOLUTE COUNT: 5.6 K/UL (ref 1.3–9.1)
SODIUM BLD-SCNC: 133 MMOL/L (ref 135–144)
SOURCE: NORMAL
SPECIFIC GRAVITY UA: 1.01 (ref 1–1.03)
SPECIMEN DESCRIPTION: NORMAL
TOTAL PROTEIN: 7.2 G/DL (ref 6.4–8.3)
TROPONIN, HIGH SENSITIVITY: <6 NG/L (ref 0–22)
TURBIDITY: CLEAR
URINE HGB: NEGATIVE
UROBILINOGEN, URINE: NORMAL
WBC # BLD: 7.9 K/UL (ref 3.5–11)

## 2022-03-29 PROCEDURE — 93005 ELECTROCARDIOGRAM TRACING: CPT | Performed by: EMERGENCY MEDICINE

## 2022-03-29 PROCEDURE — 71045 X-RAY EXAM CHEST 1 VIEW: CPT

## 2022-03-29 PROCEDURE — 87636 SARSCOV2 & INF A&B AMP PRB: CPT

## 2022-03-29 PROCEDURE — 84484 ASSAY OF TROPONIN QUANT: CPT

## 2022-03-29 PROCEDURE — 99284 EMERGENCY DEPT VISIT MOD MDM: CPT

## 2022-03-29 PROCEDURE — 6360000002 HC RX W HCPCS: Performed by: EMERGENCY MEDICINE

## 2022-03-29 PROCEDURE — 83690 ASSAY OF LIPASE: CPT

## 2022-03-29 PROCEDURE — 81003 URINALYSIS AUTO W/O SCOPE: CPT

## 2022-03-29 PROCEDURE — 96374 THER/PROPH/DIAG INJ IV PUSH: CPT

## 2022-03-29 PROCEDURE — 85025 COMPLETE CBC W/AUTO DIFF WBC: CPT

## 2022-03-29 PROCEDURE — 36415 COLL VENOUS BLD VENIPUNCTURE: CPT

## 2022-03-29 PROCEDURE — 2580000003 HC RX 258: Performed by: EMERGENCY MEDICINE

## 2022-03-29 PROCEDURE — 80053 COMPREHEN METABOLIC PANEL: CPT

## 2022-03-29 RX ORDER — 0.9 % SODIUM CHLORIDE 0.9 %
1000 INTRAVENOUS SOLUTION INTRAVENOUS ONCE
Status: COMPLETED | OUTPATIENT
Start: 2022-03-29 | End: 2022-03-29

## 2022-03-29 RX ORDER — ONDANSETRON 4 MG/1
4 TABLET, ORALLY DISINTEGRATING ORAL EVERY 8 HOURS PRN
Qty: 20 TABLET | Refills: 0 | Status: SHIPPED | OUTPATIENT
Start: 2022-03-29 | End: 2022-09-06 | Stop reason: ALTCHOICE

## 2022-03-29 RX ORDER — ONDANSETRON 2 MG/ML
4 INJECTION INTRAMUSCULAR; INTRAVENOUS ONCE
Status: COMPLETED | OUTPATIENT
Start: 2022-03-29 | End: 2022-03-29

## 2022-03-29 RX ADMIN — SODIUM CHLORIDE 1000 ML: 9 INJECTION, SOLUTION INTRAVENOUS at 13:14

## 2022-03-29 RX ADMIN — ONDANSETRON 4 MG: 2 INJECTION INTRAMUSCULAR; INTRAVENOUS at 13:14

## 2022-03-29 ASSESSMENT — ENCOUNTER SYMPTOMS
COUGH: 0
DIARRHEA: 1
SHORTNESS OF BREATH: 0
NAUSEA: 0
ABDOMINAL PAIN: 0
BLOOD IN STOOL: 0
VOMITING: 0
CONSTIPATION: 0
SORE THROAT: 0
BACK PAIN: 0
TROUBLE SWALLOWING: 0
COLOR CHANGE: 0

## 2022-03-29 ASSESSMENT — PAIN DESCRIPTION - LOCATION: LOCATION: HEAD

## 2022-03-29 ASSESSMENT — PAIN SCALES - GENERAL: PAINLEVEL_OUTOF10: 5

## 2022-03-29 ASSESSMENT — PAIN - FUNCTIONAL ASSESSMENT: PAIN_FUNCTIONAL_ASSESSMENT: 0-10

## 2022-03-29 ASSESSMENT — PAIN DESCRIPTION - PAIN TYPE: TYPE: ACUTE PAIN

## 2022-03-29 NOTE — ED TRIAGE NOTES
Mode of arrival (squad #, walk in, police, etc) : walk in        Chief complaint(s): JULIUS, dizzy, headache         Arrival Note (brief scenario, treatment PTA, etc). : Generally not feeling well, onset 3 days- dizzy, pale, nausea (no vomiting), fatigue. Feels like he could pass out. (-) covid test at home, not around sick persons. C= \"Have you ever felt that you should Cut down on your drinking? \"  no  A= \"Have people Annoyed you by criticizing your drinking? \"  no  G= \"Have you ever felt bad or Guilty about your drinking? \"  no  E= \"Have you ever had a drink as an Eye-opener first thing in the morning to steady your nerves or to help a hangover? \" no     Deferred []      Reason for deferring: na    *If yes to two or more: probable alcohol abuse. *

## 2022-03-29 NOTE — ED TRIAGE NOTES
Pt presents to the Er with three days of body aches, fatigue, generalized malaise. Pt states he was having diarrhea and nausea and \"flu like symptoms\". . Pt called the ambulance yesterday to be evaluated and the EMS people advised pt to not come to the Er and to have his wife bring him. . Pt decided that since the symptoms persisted, to come today.

## 2022-03-29 NOTE — ED PROVIDER NOTES
[address]  EMERGENCY DEPARTMENT ENCOUNTER    Pt Name: Ana Vasquez  MRN: [id number]  YOB: 1985  Date of evaluation:3/29/22  PCP: Kevin Childress MD    [address] [place] [address]       Chief Complaint   Patient presents with    Shortness of Breath    Diarrhea    Nausea       HISTORY OF PRESENT ILLNESS    Ana Vasquez is a 43 y.o. male who presents with a chief complaint of feeling ill and diarrhea. States he has felt this way for about 3 days. He has been feeling dizzy and lightheaded. Almost felt he was going to pass out yesterday. No real abdominal pain. No nausea or vomiting. No chest pain. No cough, fevers or chills. He took a home Covid test which was negative. Symptoms are acute. Symptoms are moderate. Nothing else make symptoms better or worse. Patient has no other complaints at this time. REVIEW OF SYSTEMS       Review of Systems   Constitutional: Positive for fever. Negative for chills and fatigue. HENT: Negative for congestion, ear pain, sore throat and trouble swallowing. Eyes: Negative for visual disturbance. Respiratory: Negative for cough and shortness of breath. Cardiovascular: Negative for chest pain, palpitations and leg swelling. Gastrointestinal: Positive for diarrhea. Negative for abdominal pain, blood in stool, constipation, nausea and vomiting. Genitourinary: Negative for dysuria and flank pain. Musculoskeletal: Negative for arthralgias, back pain, myalgias and neck pain. Skin: Negative for color change, rash and wound. Neurological: Positive for dizziness and light-headedness. Negative for weakness, numbness and headaches. Psychiatric/Behavioral: Negative for confusion. All other systems reviewed and are negative. Negative in 10 essential Systems except as mentioned above and in the HPI.         PAST MEDICAL HISTORY     Past Medical History:   Diagnosis Date    Cervical disc herniation     C7 herniation    Substance abuse ([facility] [address]     pt uses cannabis; currently taking Methadone from [facility]; previous snorting heroin          SURGICAL HISTORY      has a past surgical history that includes Facial cosmetic surgery. CURRENT MEDICATIONS       Previous Medications    No medications on file       ALLERGIES     is allergic to codeine. FAMILY HISTORY     He indicated that his mother is . He indicated that his father is . He indicated that his maternal grandmother is . He indicated that his maternal grandfather is . He indicated that his paternal grandmother is . He indicated that his paternal grandfather is . He indicated that the status of his other is unknown.     family history includes Cancer in his father and another family member; Other in his mother. SOCIAL HISTORY      reports that he has been smoking cigarettes. He has a 25.00 pack-year smoking history. He has never used smokeless tobacco. He reports current drug use. Drug: Marijuana [place]). He reports that he does not drink alcohol. PHYSICAL EXAM     INITIAL VITALS:  height is 6' (1.829 m) and weight is 280 lb (127 kg). His oral temperature is 98.3 °F (36.8 °C). His blood pressure is 118/73 and his pulse is 67. His respiration is 19 and oxygen saturation is 97%. Physical Exam  Vitals and nursing note reviewed. Constitutional:       General: He is not in acute distress. HENT:      Head: Normocephalic and atraumatic. Eyes:      Conjunctiva/sclera: Conjunctivae normal.      Pupils: Pupils are equal, round, and reactive to light. Cardiovascular:      Rate and Rhythm: Normal rate and regular rhythm. Heart sounds: Normal heart sounds. No murmur heard. Pulmonary:      Effort: Pulmonary effort is normal. No respiratory distress. Breath sounds: Normal breath sounds. Abdominal:      General: Bowel sounds are normal. There is no distension. Palpations: Abdomen is soft. Tenderness:  There is no abdominal tenderness. Musculoskeletal:         General: No tenderness. Cervical back: Neck supple. Lymphadenopathy:      Cervical: No cervical adenopathy. Skin:     General: Skin is warm and dry. Findings: No rash. Neurological:      Mental Status: He is alert and oriented to person, place, and time. Psychiatric:         Judgment: Judgment normal.           DIFFERENTIAL DIAGNOSIS/MDM:   17-year-old male presents with a few days of diarrhea, generalized fatigue, not feeling himself. He is afebrile, nontoxic, normal vital signs. No acute distress. No abdominal tenderness on my exam.    Differential includes viral syndrome, dehydration, metabolic abnormality, HUHYK-23, influenza. We will get lab work, give IV fluids, reassess. DIAGNOSTIC RESULTS     EKG: All EKG's are interpreted by the Emergency Department Physician who either signs or Co-signs this chart in the absence of a cardiologist.    EKG Interpretation    Interpreted by me    Rhythm: normal sinus   Rate: normal  Axis: normal  Ectopy: none  Conduction: normal  ST Segments: no acute change  T Waves: no acute change  Q Waves: none    Clinical Impression: Nonspecific EKG    RADIOLOGY:   I directly visualized the following  images and reviewed the radiologist interpretations:  XR CHEST PORTABLE   Final Result   No acute airspace disease identified.                  ED BEDSIDE ULTRASOUND:      LABS:  Labs Reviewed   CBC WITH AUTO DIFFERENTIAL - Abnormal; Notable for the following components:       Result Value    Seg Neutrophils 70 (*)     Lymphocytes 20 (*)     All other components within normal limits   COMPREHENSIVE METABOLIC PANEL - Abnormal; Notable for the following components:    Sodium 133 (*)     All other components within normal limits   LIPASE - Abnormal; Notable for the following components:    Lipase 9 (*)     All other components within normal limits   URINALYSIS WITH REFLEX TO CULTURE - Abnormal; Notable for the following components:    pH, UA 8.5 (*)     All other components within normal limits   COVID-19 & INFLUENZA COMBO   TROPONIN         EMERGENCY DEPARTMENT COURSE:   Vitals:    Vitals:    03/29/22 1217 03/29/22 1255 03/29/22 1433 03/29/22 1435   BP:  (!) 123/105 118/73 118/73   Pulse:  68 65 67   Resp: 20 18 18 19   Temp:       TempSrc:       SpO2: 98% 98% 97% 97%   Weight: 280 lb (127 kg)      Height: 6' (1.829 m)        Work-up was unremarkable here. Electrolytes are normal.  Covid and flu testing is negative. X-ray is unremarkable. He appears well-hydrated. He is tolerating p.o. Patient most likely with a gastrointestinal virus. Will discharge home with antiemetics. Advised to return if any symptoms worsen. He is agreeable plan will be discharged home today. CRITICALCARE:      CONSULTS:  None      PROCEDURES:      FINAL IMPRESSION      1. Viral syndrome            DISPOSITION/PLAN   DISPOSITION Decision To Discharge 03/29/2022 03:48:11 PM          PATIENT REFERRED TO:  Sriram Guillermo MD  [address] [place] [address] [address] [phone]    Schedule an appointment as soon as possible for a visit       [facility] ED  [place] [address] [phone]    As needed, If symptoms worsen      DISCHARGE MEDICATIONS:  New Prescriptions    ONDANSETRON (ZOFRAN ODT) 4 MG DISINTEGRATING TABLET    Take 1 tablet by mouth every 8 hours as needed for Nausea       The care is provided during an unprecedented national emergency due to the novel coronavirus, COVID-19.     (Please note that portions ofthis note were completed with a voice recognition program.  Efforts were made to edit the dictations but occasionally words are mis-transcribed.)    Hussein Hayes DO  Attending Emergency Physician          Hussein Hayes DO  03/29/22 6098

## 2022-03-30 LAB
EKG ATRIAL RATE: 73 BPM
EKG P AXIS: 53 DEGREES
EKG P-R INTERVAL: 182 MS
EKG Q-T INTERVAL: 376 MS
EKG QRS DURATION: 102 MS
EKG QTC CALCULATION (BAZETT): 414 MS
EKG R AXIS: 25 DEGREES
EKG T AXIS: 20 DEGREES
EKG VENTRICULAR RATE: 73 BPM

## 2022-03-30 PROCEDURE — 93010 ELECTROCARDIOGRAM REPORT: CPT | Performed by: INTERNAL MEDICINE

## 2022-09-06 ENCOUNTER — HOSPITAL ENCOUNTER (EMERGENCY)
Age: 37
Discharge: HOME | End: 2022-09-06
Attending: EMERGENCY MEDICINE
Payer: MEDICARE

## 2022-09-06 VITALS
HEIGHT: 72 IN | WEIGHT: 270 LBS | BODY MASS INDEX: 36.57 KG/M2 | DIASTOLIC BLOOD PRESSURE: 76 MMHG | SYSTOLIC BLOOD PRESSURE: 148 MMHG | TEMPERATURE: 97.8 F | OXYGEN SATURATION: 97 % | RESPIRATION RATE: 14 BRPM | HEART RATE: 78 BPM

## 2022-09-06 DIAGNOSIS — R42 DIZZINESS: Primary | ICD-10-CM

## 2022-09-06 DIAGNOSIS — R53.83 FATIGUE, UNSPECIFIED TYPE: ICD-10-CM

## 2022-09-06 DIAGNOSIS — R07.9 CHEST PAIN, UNSPECIFIED TYPE: ICD-10-CM

## 2022-09-06 LAB
ABSOLUTE EOS #: 0.4 K/UL (ref 0–0.4)
ABSOLUTE LYMPH #: 2.3 K/UL (ref 1–4.8)
ABSOLUTE MONO #: 0.6 K/UL (ref 0.1–1.3)
ALBUMIN SERPL-MCNC: 4.2 G/DL (ref 3.5–5.2)
ALP BLD-CCNC: 85 U/L (ref 40–129)
ALT SERPL-CCNC: 25 U/L (ref 5–41)
ANION GAP SERPL CALCULATED.3IONS-SCNC: 7 MMOL/L (ref 9–17)
AST SERPL-CCNC: 21 U/L
BASOPHILS # BLD: 0 % (ref 0–2)
BASOPHILS ABSOLUTE: 0 K/UL (ref 0–0.2)
BILIRUB SERPL-MCNC: 0.7 MG/DL (ref 0.3–1.2)
BUN BLDV-MCNC: 11 MG/DL (ref 6–20)
CALCIUM SERPL-MCNC: 9.4 MG/DL (ref 8.6–10.4)
CHLORIDE BLD-SCNC: 103 MMOL/L (ref 98–107)
CO2: 27 MMOL/L (ref 20–31)
CREAT SERPL-MCNC: 0.78 MG/DL (ref 0.7–1.2)
EKG ATRIAL RATE: 71 BPM
EKG P AXIS: 20 DEGREES
EKG P-R INTERVAL: 182 MS
EKG Q-T INTERVAL: 378 MS
EKG QRS DURATION: 104 MS
EKG QTC CALCULATION (BAZETT): 410 MS
EKG R AXIS: 13 DEGREES
EKG T AXIS: 18 DEGREES
EKG VENTRICULAR RATE: 71 BPM
EOSINOPHILS RELATIVE PERCENT: 5 % (ref 0–4)
GFR AFRICAN AMERICAN: >60 ML/MIN
GFR NON-AFRICAN AMERICAN: >60 ML/MIN
GFR SERPL CREATININE-BSD FRML MDRD: ABNORMAL ML/MIN/{1.73_M2}
GLUCOSE BLD-MCNC: 87 MG/DL (ref 70–99)
HCT VFR BLD CALC: 44 % (ref 41–53)
HEMOGLOBIN: 14.8 G/DL (ref 13.5–17.5)
LYMPHOCYTES # BLD: 29 % (ref 24–44)
MAGNESIUM: 2.2 MG/DL (ref 1.6–2.6)
MCH RBC QN AUTO: 27.9 PG (ref 26–34)
MCHC RBC AUTO-ENTMCNC: 33.8 G/DL (ref 31–37)
MCV RBC AUTO: 82.8 FL (ref 80–100)
MONOCYTES # BLD: 7 % (ref 1–7)
PDW BLD-RTO: 14.2 % (ref 11.5–14.9)
PLATELET # BLD: 227 K/UL (ref 150–450)
PMV BLD AUTO: 8.2 FL (ref 6–12)
POTASSIUM SERPL-SCNC: 4 MMOL/L (ref 3.7–5.3)
RBC # BLD: 5.31 M/UL (ref 4.5–5.9)
SEG NEUTROPHILS: 59 % (ref 36–66)
SEGMENTED NEUTROPHILS ABSOLUTE COUNT: 4.6 K/UL (ref 1.3–9.1)
SODIUM BLD-SCNC: 137 MMOL/L (ref 135–144)
TOTAL PROTEIN: 7.2 G/DL (ref 6.4–8.3)
TROPONIN, HIGH SENSITIVITY: <6 NG/L (ref 0–22)
WBC # BLD: 8 K/UL (ref 3.5–11)

## 2022-09-06 PROCEDURE — 93010 ELECTROCARDIOGRAM REPORT: CPT | Performed by: INTERNAL MEDICINE

## 2022-09-06 PROCEDURE — 80053 COMPREHEN METABOLIC PANEL: CPT

## 2022-09-06 PROCEDURE — 85025 COMPLETE CBC W/AUTO DIFF WBC: CPT

## 2022-09-06 PROCEDURE — 36415 COLL VENOUS BLD VENIPUNCTURE: CPT

## 2022-09-06 PROCEDURE — 99284 EMERGENCY DEPT VISIT MOD MDM: CPT

## 2022-09-06 PROCEDURE — 84484 ASSAY OF TROPONIN QUANT: CPT

## 2022-09-06 PROCEDURE — 83735 ASSAY OF MAGNESIUM: CPT

## 2022-09-06 PROCEDURE — 93005 ELECTROCARDIOGRAM TRACING: CPT | Performed by: EMERGENCY MEDICINE

## 2022-09-06 ASSESSMENT — PAIN SCALES - GENERAL: PAINLEVEL_OUTOF10: 0

## 2022-09-06 ASSESSMENT — PAIN - FUNCTIONAL ASSESSMENT: PAIN_FUNCTIONAL_ASSESSMENT: 0-10

## 2022-09-06 NOTE — ED NOTES
This nurse in to draw pt's repeat troponin level and to obtain orthostatic BP and pulses. Pt refuses these evaluations. Pt remains A+O x 4, GCS = 15, PMS x 4 intact, eupneic, and PWD. Pt is having appropriate conversation with this nurse.        Jessee Vitale RN  09/06/22 6769

## 2022-09-06 NOTE — ED NOTES
Presents to the ED with chest pain earlier. Having dizziness for 2 months. States this has been going on for awhile. Stated when he got up today, went into the bathroom, when he came out he felt dizzy like he wanted to pass out.       Mirella Crockett RN  09/06/22 5534

## 2022-09-06 NOTE — ED PROVIDER NOTES
[address]  EMERGENCY DEPARTMENT ENCOUNTER      Pt Name: Heidy Vance  MRN: [id number]  [place] 1985  Date of evaluation: 9/6/22      CHIEF COMPLAINT       Chief Complaint   Patient presents with    Dizziness     Felt dizzy after using the bathroom. States this has been going on since march, feels like he is having memory loss, hx of drug abuse. States he was on methodone for 8 years. Stopped the methadone June 2021     HISTORY OF PRESENT ILLNESS   HPI 42 y.o. male presents with c/o lightheadedness. Symptoms have been going on since march. He states symptoms are intermittent. He can't pinpoint any provocative or palliative factors. He states that he was seen in March. He was evaluated and no major abnormalities found. He states he has had similar episodes about 3 to 4 times over the last 6 months. Now for the last 2 weeks he has been having episodes daily. He reports chronic fatigue. He reports that he has been having a left sided chest pain that feels like he \"pulled a muscle\". That has been bothering him since. REVIEW OF SYSTEMS       Review of Systems   Constitutional:  Positive for fatigue. Negative for fever. HENT:  Negative for congestion. Eyes:  Negative for visual disturbance. Respiratory:  Negative for cough. Gastrointestinal:  Negative for vomiting. Genitourinary:  Negative for difficulty urinating. Musculoskeletal:  Negative for back pain. Skin:  Negative for rash. Neurological:  Positive for dizziness and light-headedness. Negative for headaches.      PAST MEDICAL HISTORY     Past Medical History:   Diagnosis Date    Cervical disc herniation     C7 herniation    Drug abuse ([facility] [address]     Substance abuse ([facility] [address]     pt uses cannabis; currently taking Methadone from [facility] previous snorting heroin        SURGICAL HISTORY       Past Surgical History:   Procedure Laterality Date    FACIAL COSMETIC SURGERY      from dog bite injury       CURRENT MEDICATIONS EKG: All EKG's are interpreted by the Emergency Department Physician who either signs or Co-signs this chart in the absence of a cardiologist.    EKG shows a sinus rhythm. HR is 71, , , QTC 4110, no JULIO, No STD, No TWI, the axis is normal.      LABS: All lab results were reviewed by myself, and all abnormals are listed below. Labs Reviewed   CBC WITH AUTO DIFFERENTIAL - Abnormal; Notable for the following components:       Result Value    Eosinophils % 5 (*)     All other components within normal limits   COMPREHENSIVE METABOLIC PANEL - Abnormal; Notable for the following components:    Anion Gap 7 (*)     All other components within normal limits   MAGNESIUM   TROPONIN       EMERGENCY DEPARTMENT COURSE:   Vitals:    Vitals:    09/06/22 1035 09/06/22 1036   BP: (!) 148/76    Pulse: 78    Resp: 14 14   Temp: 97.8 °F (36.6 °C)    TempSrc: Oral    SpO2: 97%    Weight:  270 lb (122.5 kg)   Height:  6' (1.829 m)       The patient was given the following medications while in the emergency department:  No orders of the defined types were placed in this encounter. -------------------------  CRITICAL CARE:   CONSULTS: None  PROCEDURES: Procedures     FINAL IMPRESSION      1. Dizziness    2. Chest pain, unspecified type    3. Fatigue, unspecified type          DISPOSITION/PLAN   DISPOSITION Decision To Discharge 09/06/2022 12:36:08 PM      PATIENT REFERRED TO:  MD Rachele Wang [facility] [address] [phone]    In 1 day      [facility] ED  [facility] [address] [facility] [address]  [phone]    If symptoms worsen    DISCHARGE MEDICATIONS:  There are no discharge medications for this patient.         Blas Bergeron MD  Attending Emergency Physician                      Blas Bergeron MD  09/07/22 0897

## 2022-09-07 ASSESSMENT — ENCOUNTER SYMPTOMS
VOMITING: 0
BACK PAIN: 0
COUGH: 0

## 2022-09-19 ENCOUNTER — HOSPITAL ENCOUNTER (OUTPATIENT)
Age: 37
Setting detail: SPECIMEN
Discharge: HOME | End: 2022-09-19

## 2022-09-19 ENCOUNTER — OFFICE VISIT (OUTPATIENT)
Dept: INTERNAL MEDICINE CLINIC | Age: 37
End: 2022-09-19
Payer: MEDICARE

## 2022-09-19 VITALS
OXYGEN SATURATION: 99 % | HEART RATE: 89 BPM | SYSTOLIC BLOOD PRESSURE: 118 MMHG | HEIGHT: 72 IN | WEIGHT: 271.2 LBS | BODY MASS INDEX: 36.73 KG/M2 | DIASTOLIC BLOOD PRESSURE: 70 MMHG

## 2022-09-19 DIAGNOSIS — R42 DIZZINESS: ICD-10-CM

## 2022-09-19 DIAGNOSIS — R94.39 ABNORMAL STRESS TEST: ICD-10-CM

## 2022-09-19 DIAGNOSIS — R42 DIZZINESS: Primary | ICD-10-CM

## 2022-09-19 LAB
D-DIMER QUANTITATIVE: 0.48 MG/L FEU
TSH SERPL DL<=0.05 MIU/L-ACNC: 2.53 UIU/ML (ref 0.3–5)

## 2022-09-19 PROCEDURE — G8427 DOCREV CUR MEDS BY ELIG CLIN: HCPCS | Performed by: INTERNAL MEDICINE

## 2022-09-19 PROCEDURE — 99214 OFFICE O/P EST MOD 30 MIN: CPT | Performed by: INTERNAL MEDICINE

## 2022-09-19 PROCEDURE — 4004F PT TOBACCO SCREEN RCVD TLK: CPT | Performed by: INTERNAL MEDICINE

## 2022-09-19 PROCEDURE — G8417 CALC BMI ABV UP PARAM F/U: HCPCS | Performed by: INTERNAL MEDICINE

## 2022-09-19 SDOH — ECONOMIC STABILITY: FOOD INSECURITY: WITHIN THE PAST 12 MONTHS, THE FOOD YOU BOUGHT JUST DIDN'T LAST AND YOU DIDN'T HAVE MONEY TO GET MORE.: NEVER TRUE

## 2022-09-19 SDOH — ECONOMIC STABILITY: FOOD INSECURITY: WITHIN THE PAST 12 MONTHS, YOU WORRIED THAT YOUR FOOD WOULD RUN OUT BEFORE YOU GOT MONEY TO BUY MORE.: NEVER TRUE

## 2022-09-19 ASSESSMENT — PATIENT HEALTH QUESTIONNAIRE - PHQ9
SUM OF ALL RESPONSES TO PHQ QUESTIONS 1-9: 0
SUM OF ALL RESPONSES TO PHQ QUESTIONS 1-9: 0
1. LITTLE INTEREST OR PLEASURE IN DOING THINGS: 0
SUM OF ALL RESPONSES TO PHQ9 QUESTIONS 1 & 2: 0
SUM OF ALL RESPONSES TO PHQ QUESTIONS 1-9: 0
SUM OF ALL RESPONSES TO PHQ QUESTIONS 1-9: 0
2. FEELING DOWN, DEPRESSED OR HOPELESS: 0

## 2022-09-19 ASSESSMENT — SOCIAL DETERMINANTS OF HEALTH (SDOH): HOW HARD IS IT FOR YOU TO PAY FOR THE VERY BASICS LIKE FOOD, HOUSING, MEDICAL CARE, AND HEATING?: NOT HARD AT ALL

## 2022-09-19 NOTE — PROGRESS NOTES
Visit Information    Have you changed or started any medications since your last visit including any over-the-counter medicines, vitamins, or herbal medicines? no   Are you having any side effects from any of your medications? -  no  Have you stopped taking any of your medications? Is so, why? -  no    Have you seen any other physician or provider since your last visit? Yes - Records Obtained  Have you had any other diagnostic tests since your last visit? Yes - Records Obtained  Have you been seen in the emergency room and/or had an admission to a hospital since we last saw you? No  Have you had your routine dental cleaning in the past 6 months? no    Have you activated your [employer] account? If not, what are your barriers?  Yes     Patient Care Team:  Piero Lockwood MD as PCP - General (Internal Medicine)  Piero Lockwood MD as PCP - [facility] Provider    Medical History Review  Past Medical, Family, and Social History reviewed and does contribute to the patient presenting condition    Health Maintenance   Topic Date Due    Varicella vaccine (1 of 2 - 2-dose childhood series) Never done    Pneumococcal 0-64 years Vaccine (2 - PCV) 07/29/2017    COVID-19 Vaccine (2 - Pfizer series) 08/20/2021    Depression Screen  01/26/2022    Flu vaccine (1) 09/01/2022    DTaP/Tdap/Td vaccine (3 - Td or Tdap) 03/13/2028    Hepatitis C screen  Completed    HIV screen  Completed    Hepatitis A vaccine  Aged Out    Hepatitis B vaccine  Aged Out    Hib vaccine  Aged Out    Meningococcal (ACWY) vaccine  Aged Out     SUBJECTIVE:  Apolonia Hester is a 42 y.o. male patient who  comes for complaints of   Chief Complaint   Patient presents with    Dizziness     Patient has an episode back in march and recently had another episode 3 weeks ago       Lightheaded, recurrent  Initially in Mar- was taken to ER- thought to be viral illness  Recurred 9/6- EKG WNL, labs ok,   Associated with constt fatigue  Some changes in peripheral vision  Pt left AMA from ER as he was expecting a CT scan which was not being done  Couple episodes Mar to Aug- Sympt improved with laying down, and sleeping  Pt was seen in urgent care for this issue on 9/6, was sent to ER- but was not having any symp  on that day    Sept 15, he was vacuuming at work, started to feel lightheaded, sat down for about 45min, was feelign lightheded evern while sitting, pt was sent home, pt slept in his car for about 30min, felt better and then was able to drive home. Denies CP/palpitaions/headache    No associated nausea/no vomiting/  No hearing changes  Denies associated SOB  No focal weakness  Reports fatigue, no wt changes      Has had couple episodes since then as well. Alcohol- no alcohol  Smoker- 1PPD  Smokes marijuana- daily      REVIEW OF SYSTEMS (except Subjective (HPI))  GENERAL: No fevers / chills  RESPIRATORY: Negative for cough, wheezing or shortness of breath  CARDIOVASCULAR: Negative for chest pain or palpitations.   GI: no nausea, vomiting, or diarrhea  NEURO: No history of headaches, positive for dizziness    Past Medical History:   Diagnosis Date    Cervical disc herniation     C7 herniation    Drug abuse ([facility] [address]     Substance abuse ([facility] [address]     pt uses cannabis; currently taking Methadone from [facility]; previous snorting heroin        SOCIAL HISTORY:  Social History     Socioeconomic History    Marital status: Single     Spouse name: Not on file    Number of children: Not on file    Years of education: Not on file    Highest education level: Not on file   Occupational History    Not on file   Tobacco Use    Smoking status: Every Day     Packs/day: 1.00     Years: 25.00     Pack years: 25.00     Types: Cigarettes    Smokeless tobacco: Never   Vaping Use    Vaping Use: Never used   Substance and Sexual Activity    Alcohol use: No    Drug use: Yes     Types: Marijuana (Weed)     Comment: pt uses cannabis; currently taking Methadone from [facility]; previous snorting heroin Sexual activity: Yes     Partners: Female   Other Topics Concern    Not on file   Social History Narrative    Not on file     Social Determinants of Health     Financial Resource Strain: Low Risk     Difficulty of Paying Living Expenses: Not hard at all   Food Insecurity: No Food Insecurity    Worried About Running Out of Food in the Last Year: Never true    Ran Out of Food in the Last Year: Never true   Transportation Needs: Not on file   Physical Activity: Not on file   Stress: Not on file   Social Connections: Not on file   Intimate Partner Violence: Not on file   Housing Stability: Not on file           CURRENT MEDICATIONS:  No current outpatient medications on file. No current facility-administered medications for this visit. OBJECTIVE:  Vitals:    09/19/22 1312   BP: 118/70   Pulse:    SpO2:      Body mass index is 36.78 kg/m². General exam (except above):  General appearance - well appearing, alert, in no acute distress  Head - Atraumatic, normocephalic  Eyes - EOMI, no jaundice or pallor  Lungs - Lungs clear to auscultation. No wheezing, rhonchi, rales  Heart - RRR without murmur, gallop, or rubs. No ectopy  Abdomen - Abdomen soft, non-tender. Bowel sounds normal. No masses, organomegaly  Extremities -No significant edema, or skin discoloration. Good capillary refill. Neuro - Pt Alert, awake and oriented x 3. No gross focal neurological deficits    ASSESSMENT AND PLAN (MEDICAL DECISION MAKING):   Roxy Munoz was seen today for dizziness. Diagnoses and all orders for this visit:    Dizziness  -     MRI BRAIN W WO CONTRAST; Future  -     Pavan Mcleod MD, Neurology, [facility] [address]  -     NM MYOCARDIAL SPECT REST EXERCISE OR RX; Future  -     D-Dimer, Quantitative; Future  -     Echocardiogram complete;  Future  -     TSH With Reflex Ft4; Future  -     Holter Monitor 48 Hour; Future           Follow up in:  2wk      Viri Mason MD

## 2022-09-21 ENCOUNTER — TELEPHONE (OUTPATIENT)
Dept: INTERNAL MEDICINE CLINIC | Age: 37
End: 2022-09-21

## 2022-09-21 DIAGNOSIS — R42 DIZZINESS: Primary | ICD-10-CM

## 2022-09-21 NOTE — TELEPHONE ENCOUNTER
When notifying patient of lab results he stated that he is unable to get into Dr. Michel Manual (neurology) office until next year. Would you like to recommend another neurologist or is patient okay to wait to be seen?

## 2022-09-21 NOTE — TELEPHONE ENCOUNTER
Would recommend seeing another neurologist sooner, patient to check with insurance who else is covered,  I am placing general neurology referral

## 2022-10-04 ENCOUNTER — HOSPITAL ENCOUNTER (OUTPATIENT)
Dept: NUCLEAR MEDICINE | Age: 37
Discharge: HOME | End: 2022-10-06
Payer: MEDICARE

## 2022-10-04 ENCOUNTER — HOSPITAL ENCOUNTER (OUTPATIENT)
Dept: NON INVASIVE DIAGNOSTICS | Age: 37
Discharge: HOME | End: 2022-10-04
Payer: MEDICARE

## 2022-10-04 ENCOUNTER — HOSPITAL ENCOUNTER (OUTPATIENT)
Dept: MRI IMAGING | Age: 37
Discharge: HOME | End: 2022-10-06
Payer: MEDICARE

## 2022-10-04 DIAGNOSIS — R42 DIZZINESS: ICD-10-CM

## 2022-10-04 LAB
LV EF: 55 %
LV EF: 61 %
LVEF MODALITY: NORMAL
LVEF MODALITY: NORMAL

## 2022-10-04 PROCEDURE — 70553 MRI BRAIN STEM W/O & W/DYE: CPT

## 2022-10-04 PROCEDURE — 93229 REMOTE 30 DAY ECG TECH SUPP: CPT

## 2022-10-04 PROCEDURE — 6360000002 HC RX W HCPCS: Performed by: INTERNAL MEDICINE

## 2022-10-04 PROCEDURE — 6360000004 HC RX CONTRAST MEDICATION: Performed by: INTERNAL MEDICINE

## 2022-10-04 PROCEDURE — 93017 CV STRESS TEST TRACING ONLY: CPT

## 2022-10-04 PROCEDURE — 78452 HT MUSCLE IMAGE SPECT MULT: CPT

## 2022-10-04 PROCEDURE — 2580000003 HC RX 258: Performed by: INTERNAL MEDICINE

## 2022-10-04 PROCEDURE — 93306 TTE W/DOPPLER COMPLETE: CPT

## 2022-10-04 PROCEDURE — 3430000000 HC RX DIAGNOSTIC RADIOPHARMACEUTICAL: Performed by: INTERNAL MEDICINE

## 2022-10-04 PROCEDURE — A9500 TC99M SESTAMIBI: HCPCS | Performed by: INTERNAL MEDICINE

## 2022-10-04 PROCEDURE — A9579 GAD-BASE MR CONTRAST NOS,1ML: HCPCS | Performed by: INTERNAL MEDICINE

## 2022-10-04 RX ORDER — SODIUM CHLORIDE 9 MG/ML
500 INJECTION, SOLUTION INTRAVENOUS CONTINUOUS PRN
Status: DISCONTINUED | OUTPATIENT
Start: 2022-10-04 | End: 2022-10-04 | Stop reason: HOSPADM

## 2022-10-04 RX ORDER — NITROGLYCERIN 0.4 MG/1
0.4 TABLET SUBLINGUAL EVERY 5 MIN PRN
Status: DISCONTINUED | OUTPATIENT
Start: 2022-10-04 | End: 2022-10-04 | Stop reason: HOSPADM

## 2022-10-04 RX ORDER — AMINOPHYLLINE DIHYDRATE 25 MG/ML
50 INJECTION, SOLUTION INTRAVENOUS PRN
Status: DISCONTINUED | OUTPATIENT
Start: 2022-10-04 | End: 2022-10-04 | Stop reason: HOSPADM

## 2022-10-04 RX ORDER — METOPROLOL TARTRATE 5 MG/5ML
5 INJECTION INTRAVENOUS EVERY 5 MIN PRN
Status: DISCONTINUED | OUTPATIENT
Start: 2022-10-04 | End: 2022-10-04 | Stop reason: HOSPADM

## 2022-10-04 RX ORDER — ATROPINE SULFATE 0.1 MG/ML
0.5 INJECTION INTRAVENOUS EVERY 5 MIN PRN
Status: DISCONTINUED | OUTPATIENT
Start: 2022-10-04 | End: 2022-10-04 | Stop reason: HOSPADM

## 2022-10-04 RX ORDER — SODIUM CHLORIDE 0.9 % (FLUSH) 0.9 %
10 SYRINGE (ML) INJECTION PRN
Status: DISCONTINUED | OUTPATIENT
Start: 2022-10-04 | End: 2022-10-07 | Stop reason: HOSPADM

## 2022-10-04 RX ORDER — ALBUTEROL SULFATE 90 UG/1
2 AEROSOL, METERED RESPIRATORY (INHALATION) PRN
Status: DISCONTINUED | OUTPATIENT
Start: 2022-10-04 | End: 2022-10-04 | Stop reason: HOSPADM

## 2022-10-04 RX ORDER — SODIUM CHLORIDE 0.9 % (FLUSH) 0.9 %
5-40 SYRINGE (ML) INJECTION PRN
Status: DISCONTINUED | OUTPATIENT
Start: 2022-10-04 | End: 2022-10-04 | Stop reason: HOSPADM

## 2022-10-04 RX ADMIN — SODIUM CHLORIDE, PRESERVATIVE FREE 10 ML: 5 INJECTION INTRAVENOUS at 08:28

## 2022-10-04 RX ADMIN — REGADENOSON 0.4 MG: 0.08 INJECTION, SOLUTION INTRAVENOUS at 09:11

## 2022-10-04 RX ADMIN — SODIUM CHLORIDE, PRESERVATIVE FREE 10 ML: 5 INJECTION INTRAVENOUS at 07:25

## 2022-10-04 RX ADMIN — TETRAKIS(2-METHOXYISOBUTYLISOCYANIDE)COPPER(I) TETRAFLUOROBORATE 33.6 MILLICURIE: 1 INJECTION, POWDER, LYOPHILIZED, FOR SOLUTION INTRAVENOUS at 09:10

## 2022-10-04 RX ADMIN — TETRAKIS(2-METHOXYISOBUTYLISOCYANIDE)COPPER(I) TETRAFLUOROBORATE 11.8 MILLICURIE: 1 INJECTION, POWDER, LYOPHILIZED, FOR SOLUTION INTRAVENOUS at 07:25

## 2022-10-04 RX ADMIN — GADOTERIDOL 20 ML: 279.3 INJECTION, SOLUTION INTRAVENOUS at 08:26

## 2022-10-04 NOTE — PROCEDURES
[address] [place], [place] [address]                              CARDIAC STRESS TEST    PATIENT NAME: Victoriano Samano                   :        1985  MED REC NO:   [id number]                              ROOM:  ACCOUNT NO:   [de-identified]                           ADMIT DATE: 10/04/2022  PROVIDER:     [address] [place] [address]     DATE OF STUDY:  10/04/2022    ORDERING PROVIDER:  Cheryl Marrero MD    PRIMARY CARE PROVIDER:  Cheryl Marrero MD    INTERPRETING PHYSICIAN:  Annie Welsh MD    _____ STRESS TESTING    TEST TYPE: LEXISCAN CARDIOLYTE STRESS TEST  INDICATION: DIZZINESS  REFERRING PHYSICIAN: Cheryl Marrero MD    RESTING HEART RATE: 67 BEATS PER MINUTE  RESTING BLOOD PRESSURE: 122/81    MEDICATION(S) GIVEN: 0.4MG IV LEXISCAN  REASON FOR TERMINATION: MEDICATION INFUSION COMPLETE    RESTING EKG: NORMAL  STRESS HEART RESPONSE: NORMAL RESPONSE  BLOOD PRESSURE RESPONSE: APPROPRIATE  STRESS EKGs: NORMAL  CHEST DISCOMFORT: NO PAIN DURING STRESS  ISCHEMIC EKG CHANGES: NONE    EKG IMPRESSION: ELECTROCARDIOGRAPHICALLY NEGATIVE LEXISCAN STRESS TEST. RADIOISOTOPE RESULTS TO FOLLOW FROM THE DEPARTMENT OF NUCLEAR MEDICINE.     COMMENTS:      [id number] MARIELLA Welsh DO    D: 10/04/2022 8:56:22       T: 10/04/2022 10:07:01     /ARLEEN  Job#: [id number]     Doc#: Unknown    CC:    (Retain this field even if not dictated or not decipherable)

## 2022-10-06 NOTE — RESULT ENCOUNTER NOTE
Some mild abnormality seen though unlikely to cause the symptoms that patient reported however in light of test results being  \"intermediate risk\" recommend cardiology review referral filed

## 2022-10-13 ENCOUNTER — OFFICE VISIT (OUTPATIENT)
Dept: INTERNAL MEDICINE CLINIC | Age: 37
End: 2022-10-13
Payer: MEDICARE

## 2022-10-13 VITALS — WEIGHT: 266 LBS | DIASTOLIC BLOOD PRESSURE: 82 MMHG | BODY MASS INDEX: 36.08 KG/M2 | SYSTOLIC BLOOD PRESSURE: 122 MMHG

## 2022-10-13 DIAGNOSIS — R00.1 BRADYCARDIA: Primary | ICD-10-CM

## 2022-10-13 DIAGNOSIS — R42 DIZZINESS: ICD-10-CM

## 2022-10-13 LAB
ACQUISITION DURATION: NORMAL S
AVERAGE HEART RATE: 82 BPM
HOOKUP DATE: NORMAL
HOOKUP TIME: NORMAL
MAX HEART RATE: 153 BPM
MIN HEART RATE: 49 BPM
NUMBER OF QRS COMPLEXES: NORMAL
NUMBER OF SUPRAVENTRICULAR ECTOPICS: 3
NUMBER OF SUPRAVENTRICULAR ISOLATED BEATS: 3
NUMBER OF VENTRICULAR ECTOPICS: 0

## 2022-10-13 PROCEDURE — 99214 OFFICE O/P EST MOD 30 MIN: CPT | Performed by: INTERNAL MEDICINE

## 2022-10-13 PROCEDURE — 4004F PT TOBACCO SCREEN RCVD TLK: CPT | Performed by: INTERNAL MEDICINE

## 2022-10-13 PROCEDURE — G8484 FLU IMMUNIZE NO ADMIN: HCPCS | Performed by: INTERNAL MEDICINE

## 2022-10-13 PROCEDURE — G8417 CALC BMI ABV UP PARAM F/U: HCPCS | Performed by: INTERNAL MEDICINE

## 2022-10-13 PROCEDURE — G8427 DOCREV CUR MEDS BY ELIG CLIN: HCPCS | Performed by: INTERNAL MEDICINE

## 2022-10-13 NOTE — PROGRESS NOTES
Visit Information    Have you changed or started any medications since your last visit including any over-the-counter medicines, vitamins, or herbal medicines? no   Are you having any side effects from any of your medications? -  no  Have you stopped taking any of your medications? Is so, why? -  no    Have you seen any other physician or provider since your last visit? No  Have you had any other diagnostic tests since your last visit? Yes - Records Obtained  Have you been seen in the emergency room and/or had an admission to a hospital since we last saw you? No  Have you had your routine dental cleaning in the past 6 months? no    Have you activated your [employer] account? If not, what are your barriers?  Yes     Patient Care Team:  Rosy Weston MD as PCP - General (Internal Medicine)  Rosy Weston MD as PCP - [facility]    Medical History Review  Past Medical, Family, and Social History reviewed and does not contribute to the patient presenting condition    Health Maintenance   Topic Date Due    Varicella vaccine (1 of 2 - 2-dose childhood series) Never done    COVID-19 Vaccine (2 - Pfizer series) 08/20/2021    Flu vaccine (1) 08/01/2022    Depression Screen  09/19/2023    DTaP/Tdap/Td vaccine (3 - Td or Tdap) 03/13/2028    Pneumococcal 0-64 years Vaccine  Completed    Hepatitis C screen  Completed    HIV screen  Completed    Hepatitis A vaccine  Aged Out    Hib vaccine  Aged Out    Meningococcal (ACWY) vaccine  Aged Out     SUBJECTIVE:  Matt Lamb is a 42 y.o. male patient who  comes for complaints of   Chief Complaint   Patient presents with    Dizziness     Review testing       Here for r/v of sudden dizzy episodes in last few mths  Per LOV ;  \"Lightheaded, recurrent  Initially in Mar- was taken to ER- thought to be viral illness  Recurred 9/6- EKG WNL, labs ok,   Associated with constt fatigue  Some changes in peripheral vision  Pt left AMA from ER as he was expecting a CT scan which was Yes     Partners: Female   Other Topics Concern    Not on file   Social History Narrative    Not on file     Social Determinants of Health     Financial Resource Strain: Low Risk     Difficulty of Paying Living Expenses: Not hard at all   Food Insecurity: No Food Insecurity    Worried About Running Out of Food in the Last Year: Never true    Ran Out of Food in the Last Year: Never true   Transportation Needs: Not on file   Physical Activity: Not on file   Stress: Not on file   Social Connections: Not on file   Intimate Partner Violence: Not on file   Housing Stability: Not on file           CURRENT MEDICATIONS:  No current outpatient medications on file. No current facility-administered medications for this visit. OBJECTIVE:  Vitals:    10/13/22 1337   BP: 122/82     Body mass index is 36.08 kg/m². General exam (except above):  General appearance - well appearing, alert, in no acute distress  Head - Atraumatic, normocephalic  Eyes - EOMI, no jaundice or pallor  Lungs - Lungs clear to auscultation. No wheezing, rhonchi, rales  Heart - RRR without murmur, gallop, or rubs. No ectopy  Abdomen - Abdomen soft, non-tender. Bowel sounds normal. No masses, organomegaly  Extremities -No significant edema, or skin discoloration. Good capillary refill. Neuro - Pt Alert, awake and oriented x 3. No gross focal neurological deficits    ASSESSMENT AND PLAN (MEDICAL DECISION MAKING):     Domonique Gonsalez was seen today for dizziness. Diagnoses and all orders for this visit:    Bradycardia  Comments:  early morning per holter  initial dizzy episode was also in am  check sleep study  concern for ELIS  Orders:  -     Sleep Study with PAP Titration;  Future    Dizziness  Comments:  stress test intermediate risk  cards f/up pending         Follow up in: Pennie Griffith MD

## 2022-10-20 ENCOUNTER — TELEPHONE (OUTPATIENT)
Dept: NON INVASIVE DIAGNOSTICS | Age: 37
End: 2022-10-20

## 2022-10-20 NOTE — TELEPHONE ENCOUNTER
Writer called PT to schedule tilt table test. No answer, left VM with testing and call back info.     Electronically signed by Sandy Mann on 10/20/2022 at 12:25 PM

## 2022-10-25 ENCOUNTER — TELEPHONE (OUTPATIENT)
Dept: NON INVASIVE DIAGNOSTICS | Age: 37
End: 2022-10-25

## 2022-10-25 NOTE — TELEPHONE ENCOUNTER
PT scheduled for tilt table test on 11/1/2022. Arrive at 0815, NPO after MN, no AM meds, bring .     Electronically signed by Christopher Hernandez on 10/25/2022 at 12:05 PM

## 2022-10-28 ENCOUNTER — TELEPHONE (OUTPATIENT)
Dept: NON INVASIVE DIAGNOSTICS | Age: 37
End: 2022-10-28

## 2022-10-28 NOTE — TELEPHONE ENCOUNTER
Writer received message that PT would like to reschedule tilt table test, called patient and test is rescheduled for 11/3/2022.     Electronically signed by Izabel Joy on 10/28/2022 at 1:52 PM

## 2023-01-06 ENCOUNTER — TELEPHONE (OUTPATIENT)
Dept: NON INVASIVE DIAGNOSTICS | Age: 38
End: 2023-01-06